# Patient Record
Sex: MALE | Race: BLACK OR AFRICAN AMERICAN | NOT HISPANIC OR LATINO | Employment: OTHER | URBAN - METROPOLITAN AREA
[De-identification: names, ages, dates, MRNs, and addresses within clinical notes are randomized per-mention and may not be internally consistent; named-entity substitution may affect disease eponyms.]

---

## 2017-10-02 ENCOUNTER — HOSPITAL ENCOUNTER (EMERGENCY)
Facility: HOSPITAL | Age: 67
Discharge: LEFT BEFORE DISCHARGE | End: 2017-10-02
Attending: EMERGENCY MEDICINE
Payer: MEDICARE

## 2017-10-02 VITALS
SYSTOLIC BLOOD PRESSURE: 130 MMHG | HEIGHT: 66 IN | WEIGHT: 140 LBS | TEMPERATURE: 98 F | DIASTOLIC BLOOD PRESSURE: 57 MMHG | RESPIRATION RATE: 20 BRPM | HEART RATE: 100 BPM | BODY MASS INDEX: 22.5 KG/M2 | OXYGEN SATURATION: 97 %

## 2017-10-02 DIAGNOSIS — R44.3 HALLUCINATIONS: ICD-10-CM

## 2017-10-02 PROCEDURE — 99284 EMERGENCY DEPT VISIT MOD MDM: CPT | Mod: 25

## 2017-10-02 PROCEDURE — 93005 ELECTROCARDIOGRAM TRACING: CPT

## 2017-10-02 PROCEDURE — 99285 EMERGENCY DEPT VISIT HI MDM: CPT | Mod: ,,, | Performed by: EMERGENCY MEDICINE

## 2017-10-02 NOTE — ED TRIAGE NOTES
"  During initial evaluation in patient room, he states he is ready to check out and go home - he states he was only here for a check-up and has no complaints at all - oriented to Tuesday (instead of Monday) October 1st, 2017    Daughter at bedside - states that he has been "out of character" for a few days    Patient insistent that he does not want to have any lab work and refuses vital signs at this time.    "

## 2017-10-02 NOTE — ED PROVIDER NOTES
"Encounter Date: 10/2/2017    SCRIBE #1 NOTE: I, Leonila Oviedo, am scribing for, and in the presence of, Dr. Mancera.       History     Chief Complaint   Patient presents with    Abdominal Pain     Pt c/o abdominal pain & dark urine.  Daughter states pt has been "rambling & talking out of his head".     Time seen by provider: 11:31 AM    This is a 67 y.o. male with a history of HLD, venous insufficiency, arthritis, and edema who presents with complaint of abdominal pain and dark urine. The patient reports that he is at baseline and is compliant with his medication. The patient indicates the last time he drank EtOH was approximately 2-3 days ago and denies EtOH withdrawal in the past. The relative indicates that for approximately 7 days he has had associated hallucinations telling her that he has "demons in his ear that are eating him from the inside out" and telling him that he is "going to hell." She reports associated anxiety and that yesterday he was calling family members and telling them that "he was already dead." She also reports that he lives alone and slept outside last night. She denies any history of psychiatric illness, family history of psychiatric illness, or previous episodes in the past. The relative indicates that he drinks EtOH mostly on the weekends and after work he may have approximately 2-3 drinks of EtOH. She denies illegal drug use.  The relative is Aminah Adrian .      The history is provided by the patient and a relative.     Review of patient's allergies indicates:  No Known Allergies  Past Medical History:   Diagnosis Date    Arthritis     Edema     Hyperlipidemia     Venous insufficiency      Past Surgical History:   Procedure Laterality Date    no sx       Family History   Problem Relation Age of Onset    Blindness Mother     Glaucoma Mother     Heart disease Mother     Edema Mother     Cataracts Father     Heart disease Father     Diabetes Father     Deep vein " thrombosis Sister     No Known Problems Brother     No Known Problems Maternal Aunt     No Known Problems Maternal Uncle     No Known Problems Paternal Aunt     No Known Problems Paternal Uncle     No Known Problems Maternal Grandmother     No Known Problems Maternal Grandfather     No Known Problems Paternal Grandmother     No Known Problems Paternal Grandfather     Amblyopia Neg Hx     Macular degeneration Neg Hx     Retinal detachment Neg Hx     Strabismus Neg Hx     Cancer Neg Hx     Hypertension Neg Hx     Stroke Neg Hx     Thyroid disease Neg Hx      Social History   Substance Use Topics    Smoking status: Never Smoker    Smokeless tobacco: Never Used    Alcohol use 1.8 oz/week     3 Cans of beer per week      Comment: per day     Review of Systems   Constitutional: Negative for fever.   HENT: Negative for sore throat.    Respiratory: Negative for shortness of breath.    Cardiovascular: Negative for chest pain.   Gastrointestinal: Positive for abdominal pain.   Genitourinary:        Dark urine.   Musculoskeletal: Negative for back pain.   Skin: Negative for pallor.   Neurological: Negative for headaches.   Psychiatric/Behavioral: Positive for hallucinations. The patient is nervous/anxious.        Physical Exam     Initial Vitals [10/02/17 1117]   BP Pulse Resp Temp SpO2   (!) 130/57 100 20 98.3 °F (36.8 °C) 97 %      MAP       81.33         Physical Exam    Nursing note and vitals reviewed.  Constitutional: No distress.   HENT:   Head: Normocephalic and atraumatic.   Eyes: Pupils are equal, round, and reactive to light.   Pupils 2 mm and reactive bilaterally.   Cardiovascular: Normal rate, regular rhythm, normal heart sounds and intact distal pulses.   Pulmonary/Chest: Breath sounds normal.   Lungs clear bilaterally.   Abdominal: Soft. There is no tenderness.   Negative CVA tenderness bilaterally.   Musculoskeletal: Normal range of motion. He exhibits no edema.   Peripheral pulses intact.  "Bilateral LE symmetric. No cords or edema.   Skin: Skin is warm.         ED Course   Procedures  Labs Reviewed   URINALYSIS, REFLEX TO URINE CULTURE   CBC W/ AUTO DIFFERENTIAL   COMPREHENSIVE METABOLIC PANEL   URINALYSIS, REFLEX TO URINE CULTURE   DRUG SCREEN PANEL, URINE EMERGENCY   ALCOHOL,MEDICAL (ETHANOL)   ACETAMINOPHEN LEVEL   TSH   TROPONIN I   AMMONIA   MAGNESIUM             Medical Decision Making:   History:   Old Medical Records: I decided to obtain old medical records.  Initial Assessment:   I was informed by the RN that the pt is refusing all blood draws. I evaluated the pt and stated that given the hallucinations at home, his daughter was concerned that he may require further evaluation. The pt is denying any hallucinations at this time. When questioned if he was dead, he states "I am alive here talking to you. I am not dead." The pt is oriented x3 at this time and is able to state the risks for leaving. He appears to have decision making capacity. He denies HI or SI or auditory or visual hallucinations. Psychiatry paged but prior to speaking with them, the pt eloped. Again as he had the capacity at this time and has no grave disability or emergent threat to self we did not chemically or physically restrain the pt. The pts daughter was counseled to return the pt for further evaluation or to call the 's office.  Clinical Tests:   Lab Tests: Ordered and Reviewed  Radiological Study: Ordered and Reviewed  Medical Tests: Ordered and Reviewed            Scribe Attestation:   Scribe #1: I performed the above scribed service and the documentation accurately describes the services I performed. I attest to the accuracy of the note.    Attending Attestation:           Physician Attestation for Scribe:      Comments: I, Dr. Leonid Mancera, personally performed the services described in this documentation. All medical record entries made by the scribe were at my direction and in my presence.  I have reviewed " the chart and agree that the record reflects my personal performance and is accurate and complete. Leonid Mancera MD.  1:50 PM 10/02/2017              ED Course      Clinical Impression:   The encounter diagnosis was Hallucinations.    Disposition:   Disposition: Eloped                        Leonid Mancera MD  10/02/17 1971

## 2017-10-26 ENCOUNTER — NURSE TRIAGE (OUTPATIENT)
Dept: ADMINISTRATIVE | Facility: CLINIC | Age: 67
End: 2017-10-26

## 2017-10-27 NOTE — TELEPHONE ENCOUNTER
ANGEL Ceron Staff 11 hours ago (8:52 PM)      Greetings,     CHARITOI-   Caregiver patient's sister  is concerned that patient is not eating and not drinking water. Patient states he only want to eat the foood of God. Patient recently discharged from [facility]. Education completed per [facility] Care Advice including  Reporting to ER for evaluation. Caregiver verbalize understanding.     Thank you,   Ruth Faciane, RN [facility] (Routing comment)

## 2017-10-27 NOTE — TELEPHONE ENCOUNTER
"    Reason for Disposition   Nursing judgment or information in reference    Answer Assessment - Initial Assessment Questions  1. REASON FOR CALL: "What is your main concern right now?"      My concern is that patient is not eating and not drinking water. Patient only want to eat the food of God.     2. ONSET: "When did the ___ start?"      Last week of September     3. SEVERITY: "How bad is the ___?"      Severe     4. FEVER: "Do you have a fever?"     Denies    5. OTHER SYMPTOMS: "Do you have any other new symptoms?"   Weak  Feeling sad sine his uncle Gurinder Gonzales    Incontinent of stool     6. INTERVENTIONS AND RESPONSE: "What have you done so far to try to make this better? What medications have you used?"    Caregiver called to police on 2017         7. PREGNANCY: "Is there any chance you are pregnant?"      N/A    Protocols used: ST NO GUIDELINE AVAILABLE - SICK ADULT CALL-A-    Caregiver patient's sister  is concerned that patient is not eating and not drinking water. Patient states he only want to eat the foood of God. Patient recently discharged from [facility]. Education completed per [facility] including  Reporting to ER for evaluation.   Caregiver verbalize understanding.  "

## 2017-11-16 ENCOUNTER — OFFICE VISIT (OUTPATIENT)
Dept: INTERNAL MEDICINE | Facility: CLINIC | Age: 67
End: 2017-11-16
Payer: MEDICARE

## 2017-11-16 VITALS
DIASTOLIC BLOOD PRESSURE: 58 MMHG | SYSTOLIC BLOOD PRESSURE: 88 MMHG | HEIGHT: 68 IN | BODY MASS INDEX: 23.99 KG/M2 | HEART RATE: 91 BPM | WEIGHT: 158.31 LBS

## 2017-11-16 DIAGNOSIS — L89.302 DECUBITUS ULCER OF BUTTOCK, STAGE 2, UNSPECIFIED LATERALITY: Primary | ICD-10-CM

## 2017-11-16 DIAGNOSIS — I95.9 HYPOTENSION, UNSPECIFIED HYPOTENSION TYPE: ICD-10-CM

## 2017-11-16 DIAGNOSIS — R63.4 WEIGHT LOSS: ICD-10-CM

## 2017-11-16 LAB
BACTERIA #/AREA URNS AUTO: ABNORMAL /HPF
BILIRUB UR QL STRIP: NEGATIVE
CLARITY UR REFRACT.AUTO: ABNORMAL
COLOR UR AUTO: YELLOW
GLUCOSE SERPL-MCNC: 140 MG/DL (ref 70–110)
GLUCOSE UR QL STRIP: NEGATIVE
HGB UR QL STRIP: NEGATIVE
HYALINE CASTS UR QL AUTO: 8 /LPF
KETONES UR QL STRIP: NEGATIVE
LEUKOCYTE ESTERASE UR QL STRIP: NEGATIVE
MICROSCOPIC COMMENT: ABNORMAL
NITRITE UR QL STRIP: NEGATIVE
PH UR STRIP: 5 [PH] (ref 5–8)
PROT UR QL STRIP: NEGATIVE
RBC #/AREA URNS AUTO: 1 /HPF (ref 0–4)
SP GR UR STRIP: 1.02 (ref 1–1.03)
SQUAMOUS #/AREA URNS AUTO: 3 /HPF
URATE CRY UR QL COMP ASSIST: ABNORMAL
URN SPEC COLLECT METH UR: ABNORMAL
UROBILINOGEN UR STRIP-ACNC: 2 EU/DL
WBC #/AREA URNS AUTO: 1 /HPF (ref 0–5)

## 2017-11-16 PROCEDURE — 99999 PR PBB SHADOW E&M-EST. PATIENT-LVL III: CPT | Mod: PBBFAC,,, | Performed by: INTERNAL MEDICINE

## 2017-11-16 PROCEDURE — 82948 REAGENT STRIP/BLOOD GLUCOSE: CPT | Mod: PBBFAC,PO | Performed by: INTERNAL MEDICINE

## 2017-11-16 PROCEDURE — 99213 OFFICE O/P EST LOW 20 MIN: CPT | Mod: PBBFAC,PO | Performed by: INTERNAL MEDICINE

## 2017-11-16 PROCEDURE — 99214 OFFICE O/P EST MOD 30 MIN: CPT | Mod: S$PBB,,, | Performed by: INTERNAL MEDICINE

## 2017-11-16 PROCEDURE — 81001 URINALYSIS AUTO W/SCOPE: CPT

## 2017-11-16 NOTE — PROGRESS NOTES
REASON FOR VISIT:  This is a 67-year-old male who I have not seen in quite some   time.  The purpose of his appointment is that after two weeks he has noticed   ulcerations on both buttocks and is painful.      He cannot really provide any further information regarding this or what has been   going on in the interim; however, he is very cachectic appearing.  I see that   in October he presented to Emergency Room where he had delirium and dark urine,   but he  did not stay in the Emergency Room.  I see at that time,   Periactin and Zyprexa were prescribed, but he is not on the medications.  Then   finally, the last time I saw him in June 2015, his weight was 206, and his   weight today is 158.    PAST MEDICAL HISTORY:  In the past has been:  Hypertension.  Lymphedema in both legs.  Statin-induced myalgias.    SOCIAL HISTORY:  Tobacco use - none.  He reports no alcohol use.    REVIEW OF SYSTEMS:  He has no pains in the chest or abdomen.  No shortness of   breath.  He states he has regular bowel function, maybe every other day, and   reports no difficulty urinating.  However, he is wearing diapers and his   explanation for this is a while ago, he was having nocturnal urination where he   was wetting his bed, but he has not done that in a while.    PHYSICAL EXAMINATION:  VITAL SIGNS:  Weight is 158.  His blood pressure is 86/58.  NECK:  No thyromegaly.  LUNGS:  Clear.  HEART:  Regular rate and rhythm.  ABDOMEN:  Active bowel sounds, soft, nontender.  He has 2+ edema in the legs.    He does have areas of blisters on his right leg and left foot.  He has two   prominent stage 2 decubitus ulcerations on his buttocks.  DIGITAL RECTAL:  Stool is brown, heme negative.  Prostate is mildly enlarged.    IMPRESSION:  1.  Decubitus ulceration.  2.  Significant weight loss.  3.  Hypotension.    PLAN:  Urinalysis today, comprehensive set of labs, stat blood glucose, and   [facility] regarding his decubitus  ulcerations.      SU/IN  dd: 11/16/2017 11:05:52 (CST)  td: 11/17/2017 03:32:05 (CST)  Doc ID   #[id number]  Job ID #[id number]    CC:

## 2017-11-17 ENCOUNTER — INITIAL CONSULT (OUTPATIENT)
Dept: WOUND CARE | Facility: CLINIC | Age: 67
End: 2017-11-17
Payer: MEDICARE

## 2017-11-17 DIAGNOSIS — L89.302 DECUBITUS ULCER OF BUTTOCK, STAGE 2, UNSPECIFIED LATERALITY: Primary | ICD-10-CM

## 2017-11-17 DIAGNOSIS — R60.0 LOWER LEG EDEMA: ICD-10-CM

## 2017-11-17 DIAGNOSIS — R53.81 DEBILITY: ICD-10-CM

## 2017-11-17 PROCEDURE — 99212 OFFICE O/P EST SF 10 MIN: CPT | Mod: PBBFAC | Performed by: CLINICAL NURSE SPECIALIST

## 2017-11-17 PROCEDURE — 99999 PR PBB SHADOW E&M-EST. PATIENT-LVL II: CPT | Mod: PBBFAC,,, | Performed by: CLINICAL NURSE SPECIALIST

## 2017-11-17 PROCEDURE — 99202 OFFICE O/P NEW SF 15 MIN: CPT | Mod: S$PBB,,, | Performed by: CLINICAL NURSE SPECIALIST

## 2017-11-17 RX ORDER — MENTHOL AND ZINC OXIDE .44; 20.625 G/100G; G/100G
OINTMENT TOPICAL 2 TIMES DAILY
COMMUNITY
Start: 2017-11-17 | End: 2017-11-25

## 2017-11-17 NOTE — LETTER
November 17, 2017      Dickson Laurent MD  [address]  [facility] [address]           [facility]  [address]  [facility] [address]  Phone: [phone]          Patient: Oral Gonzales   MR Number: [id number]   YOB: 1950   Date of Visit: 11/17/2017       Dear Dr. Dickson Laurent:    Thank you for referring Oral Gonzales to me for evaluation. Attached you will find relevant portions of my assessment and plan of care.    If you have questions, please do not hesitate to call me. I look forward to following Oral Gonzales along with you.    Sincerely,    Rachel Norton, CNS    Enclosure  CC:  No Recipients    If you would like to receive this communication electronically, please contact [email or web address] or [phone] to request more information on [employer] access.    For providers and/or their staff who would like to refer a patient to [facility], please contact us through our one-stop-shop provider referral line, Kuldip Russ, at [phone].    If you feel you have received this communication in error or would no longer like to receive these types of communications, please e-mail [email or web address]

## 2017-11-17 NOTE — PROGRESS NOTES
"Subjective:       Patient ID: Oral Gonzales is a 67 y.o. male.    Chief Complaint: Wound Check and Pressure Ulcer    This is a new pt sent by Dr Marquez for eval of his buttocks which has stage 2's in several locations, pt is not great historian but does say he sits a lot and was "sick" of late and sat a lot resulting in apparent pressure ulcers that are stage 2 in appearance. He is continent and says he can get around however in w/c today and moves very slowly and tenatively . Lives with sister who he says does and can help him. But she is not here today.      Wound Check       Review of Systems   Constitutional: Positive for activity change and appetite change. Negative for fever.   Respiratory: Negative for cough and shortness of breath.    Cardiovascular: Positive for chest pain and leg swelling.   Gastrointestinal: Negative for constipation.   Genitourinary: Negative for hematuria.   Skin: Positive for wound.   Neurological: Positive for weakness.       Objective:      Physical Exam   Constitutional: He appears well-developed.   Pulmonary/Chest: Effort normal and breath sounds normal.   Abdominal: Soft. He exhibits no distension.   Musculoskeletal: Normal range of motion. He exhibits edema.   Skin:              Pt instructed to use calmoseptine topically to buttocks and glut creas 1-2 time a day         His lower legs are edematous, Albumin is low and discussed issues that increase swelling, he uses salt liberally he stated and eats campbells soup a lot. Discussed protein sources and to hold salt and soups for now . The marks of back of each leg in achilles region are pressure in nature from resting of foot rest too long, discussed seating options and how to monitor this ,      Assessment:       1. Decubitus ulcer of buttock, stage 2, unspecified laterality    2. Debility    3. Lower leg edema        Plan:       Topical care to the buttocks with calmoseptine daily, pt can apply to self he states but does have " sister willing to help  Discussed diet and protein sources, and to stop adding salt to food   Elevate legs but be careful with the edge of recliner and pressure to back of legs  Return if not improved  I have reviewed the plan of care with the patient and he express understanding. I spent over 50% of this 20 minute visit in face to face counseling.

## 2017-11-25 ENCOUNTER — HOSPITAL ENCOUNTER (EMERGENCY)
Facility: HOSPITAL | Age: 67
Discharge: HOME | End: 2017-11-25
Attending: EMERGENCY MEDICINE
Payer: MEDICARE

## 2017-11-25 VITALS
TEMPERATURE: 98 F | BODY MASS INDEX: 24.25 KG/M2 | SYSTOLIC BLOOD PRESSURE: 103 MMHG | HEART RATE: 76 BPM | WEIGHT: 160 LBS | OXYGEN SATURATION: 100 % | RESPIRATION RATE: 16 BRPM | HEIGHT: 68 IN | DIASTOLIC BLOOD PRESSURE: 55 MMHG

## 2017-11-25 DIAGNOSIS — R53.1 WEAKNESS: ICD-10-CM

## 2017-11-25 DIAGNOSIS — R60.0 BILATERAL LEG EDEMA: Primary | ICD-10-CM

## 2017-11-25 LAB
ALBUMIN SERPL BCP-MCNC: 2.5 G/DL
ALP SERPL-CCNC: 99 U/L
ALT SERPL W/O P-5'-P-CCNC: 41 U/L
ANION GAP SERPL CALC-SCNC: 8 MMOL/L
AST SERPL-CCNC: 32 U/L
BASOPHILS # BLD AUTO: 0.03 K/UL
BASOPHILS NFR BLD: 0.5 %
BILIRUB SERPL-MCNC: 0.2 MG/DL
BNP SERPL-MCNC: 422 PG/ML
BUN SERPL-MCNC: 11 MG/DL
CALCIUM SERPL-MCNC: 8.7 MG/DL
CHLORIDE SERPL-SCNC: 107 MMOL/L
CO2 SERPL-SCNC: 25 MMOL/L
CREAT SERPL-MCNC: 0.9 MG/DL
DIFFERENTIAL METHOD: ABNORMAL
EOSINOPHIL # BLD AUTO: 0.1 K/UL
EOSINOPHIL NFR BLD: 2.1 %
ERYTHROCYTE [DISTWIDTH] IN BLOOD BY AUTOMATED COUNT: 13.7 %
EST. GFR  (AFRICAN AMERICAN): >60 ML/MIN/1.73 M^2
EST. GFR  (NON AFRICAN AMERICAN): >60 ML/MIN/1.73 M^2
GLUCOSE SERPL-MCNC: 93 MG/DL
HCT VFR BLD AUTO: 32.1 %
HGB BLD-MCNC: 10.3 G/DL
IMM GRANULOCYTES # BLD AUTO: 0.02 K/UL
IMM GRANULOCYTES NFR BLD AUTO: 0.4 %
LYMPHOCYTES # BLD AUTO: 1.1 K/UL
LYMPHOCYTES NFR BLD: 20.1 %
MCH RBC QN AUTO: 29.9 PG
MCHC RBC AUTO-ENTMCNC: 32.1 G/DL
MCV RBC AUTO: 93 FL
MONOCYTES # BLD AUTO: 0.6 K/UL
MONOCYTES NFR BLD: 10 %
NEUTROPHILS # BLD AUTO: 3.8 K/UL
NEUTROPHILS NFR BLD: 66.9 %
NRBC BLD-RTO: 0 /100 WBC
PLATELET # BLD AUTO: 195 K/UL
PMV BLD AUTO: 8.8 FL
POTASSIUM SERPL-SCNC: 4.7 MMOL/L
PROT SERPL-MCNC: 5.9 G/DL
RBC # BLD AUTO: 3.44 M/UL
SODIUM SERPL-SCNC: 140 MMOL/L
WBC # BLD AUTO: 5.61 K/UL

## 2017-11-25 PROCEDURE — 93010 ELECTROCARDIOGRAM REPORT: CPT | Mod: ,,, | Performed by: INTERNAL MEDICINE

## 2017-11-25 PROCEDURE — 99284 EMERGENCY DEPT VISIT MOD MDM: CPT | Mod: ,,, | Performed by: EMERGENCY MEDICINE

## 2017-11-25 PROCEDURE — 83880 ASSAY OF NATRIURETIC PEPTIDE: CPT

## 2017-11-25 PROCEDURE — 85025 COMPLETE CBC W/AUTO DIFF WBC: CPT

## 2017-11-25 PROCEDURE — 80053 COMPREHEN METABOLIC PANEL: CPT

## 2017-11-25 PROCEDURE — 93005 ELECTROCARDIOGRAM TRACING: CPT

## 2017-11-25 PROCEDURE — 99284 EMERGENCY DEPT VISIT MOD MDM: CPT | Mod: 25

## 2017-11-25 RX ORDER — FUROSEMIDE 20 MG/1
20 TABLET ORAL DAILY
Qty: 7 TABLET | Refills: 0 | Status: SHIPPED | OUTPATIENT
Start: 2017-11-25 | End: 2018-03-20

## 2017-11-25 NOTE — ED TRIAGE NOTES
Pt. Arrives to ER after being seen by a family friend (nurse practitioner). She recommended that he be seen.  Pt. Presents with bilateral lower leg swelling with onset 3 weeks ago.  Pt. Complains of pain described as throbbing, fire, and pins and needles.  Skin break down noted to bilateral heels and lower extremities.

## 2017-11-25 NOTE — ED NOTES
Pt placed on continuous cardiac and pulse ox monitoring. VS stable.  Pt denies needs or complaints at this time.  Bed locked in lowest position; side rails up and locked x 2; call light and personal belongings within reach; will continue to monitor pt. Sister at bedside.

## 2017-11-25 NOTE — ED NOTES
LOC: The patient is awake, alert, and oriented to place, time, situation. Affect is appropriate.  Speech is appropriate and clear.     APPEARANCE:  Patient is clean and well groomed.    SKIN: Breakdown noted to bilateral lower extremities.  Pressure sores noted to bilateral heels.  Open sore to left heel.       RESPIRATORY: Airway is open and patent. Respirations spontaneous, even, easy, and non-labored.  Patient has a normal effort and rate.  No accessory muscle use noted.      CARDIAC:  Normal rhythm and rate noted.  4+ peripheral edema noted. No complaints of chest pain.      ABDOMEN: Soft and non tender to palpation.  No distention noted.     NEUROLOGIC: Eyes open spontaneously.  Behavior appropriate to situation.  Follows commands; facial expression symmetrical.  Purposeful motor response noted; pins and needles feeling noted to bilateral lower extremities.

## 2017-11-25 NOTE — ED PROVIDER NOTES
"Encounter Date: 11/25/2017       History     Chief Complaint   Patient presents with    Leg Swelling     bilateral legs swollen, family states "there are starting to be little sores on his legs"/     66 y/o M with PMHx of hyperlipidemia and venous insufficiency presents to the ED c/o bilateral LE edema x 1 month.  He reports the swelling worsened over the past 2 days after eating Chinese food. He has been seen by his PCP and wound care for the same complaint about 1 week ago. He states he has been trying to elevate his legs but does not have the proper chair to do so and is getting a new one. He does not wear compression stockings.  A family friend who is a NP saw him today and recommended that he come to the ED because of the leg swelling. He denies f/c, chest pain, SOB, PENA, cough, abdominal pain, abdominal distention. He drinks alcohol and denies tobacco or drug use.      The history is provided by the patient and a relative (sister).     Review of patient's allergies indicates:  No Known Allergies  Past Medical History:   Diagnosis Date    Arthritis     Edema     Hyperlipidemia     Venous insufficiency      Past Surgical History:   Procedure Laterality Date    no sx       Family History   Problem Relation Age of Onset    Blindness Mother     Glaucoma Mother     Heart disease Mother     Edema Mother     Cataracts Father     Heart disease Father     Diabetes Father     Deep vein thrombosis Sister     No Known Problems Brother     No Known Problems Maternal Aunt     No Known Problems Maternal Uncle     No Known Problems Paternal Aunt     No Known Problems Paternal Uncle     No Known Problems Maternal Grandmother     No Known Problems Maternal Grandfather     No Known Problems Paternal Grandmother     No Known Problems Paternal Grandfather     Amblyopia Neg Hx     Macular degeneration Neg Hx     Retinal detachment Neg Hx     Strabismus Neg Hx     Cancer Neg Hx     Hypertension Neg Hx  "    Stroke Neg Hx     Thyroid disease Neg Hx      Social History   Substance Use Topics    Smoking status: Never Smoker    Smokeless tobacco: Never Used    Alcohol use 1.8 oz/week     3 Cans of beer per week      Comment: per day     Review of Systems   Constitutional: Negative for chills and fever.   HENT: Negative for congestion, rhinorrhea and sore throat.    Eyes: Negative for photophobia and visual disturbance.   Respiratory: Negative for cough and shortness of breath.    Cardiovascular: Positive for leg swelling. Negative for chest pain and palpitations.   Gastrointestinal: Negative for abdominal pain, constipation, diarrhea, nausea and vomiting.   Genitourinary: Negative for dysuria and hematuria.   Musculoskeletal: Negative for neck pain and neck stiffness.   Skin: Negative for rash and wound.   Neurological: Negative for light-headedness, numbness and headaches.   Psychiatric/Behavioral: Negative for confusion.       Physical Exam     Initial Vitals [11/25/17 1444]   BP Pulse Resp Temp SpO2   (!) 126/55 98 18 98.3 °F (36.8 °C) 98 %      MAP       78.67         Physical Exam    Nursing note and vitals reviewed.  Constitutional: He appears well-developed and well-nourished. He is not diaphoretic. No distress.   HENT:   Head: Normocephalic and atraumatic.   Neck: Normal range of motion. Neck supple.   Cardiovascular: Normal rate, regular rhythm, normal heart sounds and intact distal pulses. Exam reveals no gallop and no friction rub.    No murmur heard.  3+ LE edema noted   Pulmonary/Chest: Breath sounds normal. He has no wheezes. He has no rhonchi. He has no rales.   Abdominal: Soft. Bowel sounds are normal. He exhibits no distension. There is no tenderness. There is no rebound and no guarding.   Musculoskeletal: Normal range of motion.   Neurological: He is alert and oriented to person, place, and time.   Skin: Skin is warm and dry.   Developing pressure ulcer noted to the L heel and R posterior lower  leg. No bleeding or drainage appreciated.   Psychiatric: He has a normal mood and affect.         ED Course   Procedures  Labs Reviewed   CBC W/ AUTO DIFFERENTIAL - Abnormal; Notable for the following:        Result Value    RBC 3.44 (*)     Hemoglobin 10.3 (*)     Hematocrit 32.1 (*)     MPV 8.8 (*)     All other components within normal limits   COMPREHENSIVE METABOLIC PANEL - Abnormal; Notable for the following:     Total Protein 5.9 (*)     Albumin 2.5 (*)     All other components within normal limits   B-TYPE NATRIURETIC PEPTIDE - Abnormal; Notable for the following:      (*)     All other components within normal limits        Imaging Results          X-Ray Chest 1 View (Final result)  Result time 11/25/17 16:15:11    Final result by Rafa Leyva MD (11/25/17 16:15:11)                 Impression:      No acute cardiopulmonary process.      Electronically signed by: RAFA LEYVA MD  Date:     11/25/17  Time:    16:15              Narrative:    Chest one view    Indication:Weakness    Comparison:6/25/2012    Findings:  The cardiomediastinal silhouette is not enlarged.  There is no pleural effusion.  The trachea is midline.  The lungs are symmetrically expanded bilaterally without evidence of acute parenchymal process. No large focal consolidation seen.  There is no pneumothorax.  The osseous structures are remarkable for degenerative changes.                                 Medical Decision Making:   History:   Old Medical Records: I decided to obtain old medical records.  Old Records Summarized: records from clinic visits.       <> Summary of Records: Seen by PCP (Dr Laurent) and wound care. Outpatient labs reviewed and unremarkable.  Clinical Tests:   Lab Tests: Ordered and Reviewed  Radiological Study: Ordered and Reviewed  Medical Tests: Reviewed and Ordered       APC / Resident Notes:   68 y/o M with PMHx of hyperlipidemia and venous insufficiency presents to the ED c/o bilateral LE edema x 1  month.  VSS. RRR. Lungs clear with no crackles. Abdomen soft, nontender, and nondistended. 3+ bilateral LE edema noted. Developing pressure ulcers noted to the L heel and R lower posterior leg. No bleeding or drainage noted. Normal sensation. DDx includes but is not limited to venous insufficiency, CHF, dependent edema. Will get labs, CXR.     No leukocytosis. Anemia noted with H/H 10.3/32.1. CMP unremarkable. BNP elevated at 422.    CXR with no acute cardiopulmonary process.     I do not feel that he needs any further labs or imaging at this time. Stable for discharge.    He was discharged with a prescription for lasix.  He will follow up with his PCP.  He was instructed to wear compression stockings daily.  All of the patient's questions were answered.  I reviewed the patient's chart, labs, and imaging and discussed the case with my supervising physician.                 ED Course      Clinical Impression:   The primary encounter diagnosis was Bilateral leg edema. A diagnosis of Weakness was also pertinent to this visit.    Disposition:   Disposition: Discharged  Condition: Stable       I discussed case with KARIN.  Reviewed history, physical exam findings, and plan.   I was available for supervision as indicated by the KARIN.    I, Dr. Marcelino Arrieta, personally performed the services described in this documentation. All medical record entries made by the scribe were at my direction and in my presence.  I have reviewed the chart and agree that the record reflects my personal performance and is accurate and complete. Marcelino Arrieta MD.  12:04 PM 11/26/2017                   Leonila Grande PA-C  11/25/17 7310       Marcelino Arrieta MD  11/26/17 0337

## 2017-11-25 NOTE — ED NOTES
Pt resting quietly on stretcher; remains on continuous cardiac and pulse ox monitoring with non-invasive blood pressure to cycle every 30 minutes.  VS stable; NSR noted. Bed locked in lowest position; side rails up and locked x 2; call light, bedside table, and personal belongings within reach.  Pt denies needs or complaints at this time; updated on possible discharge; will continue to monitor pt.

## 2017-11-25 NOTE — DISCHARGE INSTRUCTIONS
Start wearing compression stocking for leg swelling. Take OTC miralax as needed for constipation. Avoid salty foods.

## 2018-03-15 ENCOUNTER — TELEPHONE (OUTPATIENT)
Dept: INTERNAL MEDICINE | Facility: CLINIC | Age: 68
End: 2018-03-15

## 2018-03-15 NOTE — TELEPHONE ENCOUNTER
Called pt back  And pt states that he has been out since dec 6 and he needs a note and an appt with Dr Laurent to be seen the first available is not until 4/5/18 pt took that appt and he would like a note to return to work on the 21st. If he does not have this not he will lose his job. I explained that Dr Laurent was not in and would be returning Monday he asked that Dr Laurent please write him a note on Monday to return on Wednesday and he would come and pick it up

## 2018-03-15 NOTE — TELEPHONE ENCOUNTER
----- Message from Yazmin Guy sent at 3/15/2018 12:00 PM CDT -----  Contact: Pt mariela colin at [phone]  Pt is calling to speak with the nurse concerning return to work clearance. Pt is looking to return to work on Wednesday. Please contact pt.    This is pt second message no response.    Please contact pt      Thank you!

## 2018-03-19 ENCOUNTER — TELEPHONE (OUTPATIENT)
Dept: INTERNAL MEDICINE | Facility: CLINIC | Age: 68
End: 2018-03-19

## 2018-03-19 NOTE — TELEPHONE ENCOUNTER
Called pt back  And pt states that he has been out since dec 6 and he needs a note and an appt with Dr Laurent to be seen the first available is not until 4/5/18 pt took that appt and he would like a note to return to work on the 21st. If he does not have this not he will lose his job. I explained that Dr Laurent was not in and would be returning Monday he asked that Dr Laurent please write him a note on Monday to return on Wednesday and he would come and pick it up         Documentation

## 2018-03-19 NOTE — TELEPHONE ENCOUNTER
Patient would like to know if his return to work slip is ready to be picked up. Please call when ready

## 2018-03-19 NOTE — TELEPHONE ENCOUNTER
----- Message from Tosha Nieves sent at 3/19/2018  2:40 PM CDT -----  Contact: patient- [phone]  Patient would like to know if his return to work slip is ready to be picked up. Please call when ready

## 2018-03-19 NOTE — TELEPHONE ENCOUNTER
He has been out of work since dec 6th , he was out of it and on bed rest most of the time, he thinks it was a nervous breakdown and health problems , he has to go back to work and they called him on wednesday , he states he needs a back to work note dated from dec 6 to the 21. He was in the er for 1 day and then sent to a physiatric facility  for 3 weeks , he came home and went to stay with his sister. He couldn't do anything for himself . He needs this note to go back to work or they will terminate him.        He has an upcoming appt on 5/4/17       Please

## 2018-03-20 ENCOUNTER — OFFICE VISIT (OUTPATIENT)
Dept: INTERNAL MEDICINE | Facility: CLINIC | Age: 68
End: 2018-03-20
Payer: MEDICARE

## 2018-03-20 ENCOUNTER — LAB VISIT (OUTPATIENT)
Dept: LAB | Facility: HOSPITAL | Age: 68
End: 2018-03-20
Attending: INTERNAL MEDICINE
Payer: MEDICARE

## 2018-03-20 VITALS
HEART RATE: 80 BPM | DIASTOLIC BLOOD PRESSURE: 64 MMHG | WEIGHT: 181 LBS | BODY MASS INDEX: 27.43 KG/M2 | HEIGHT: 68 IN | SYSTOLIC BLOOD PRESSURE: 130 MMHG

## 2018-03-20 DIAGNOSIS — Z12.5 SCREENING FOR PROSTATE CANCER: ICD-10-CM

## 2018-03-20 DIAGNOSIS — Z00.00 GENERAL MEDICAL EXAM: ICD-10-CM

## 2018-03-20 DIAGNOSIS — Z00.00 GENERAL MEDICAL EXAM: Primary | ICD-10-CM

## 2018-03-20 DIAGNOSIS — R60.0 BILATERAL LEG EDEMA: ICD-10-CM

## 2018-03-20 LAB
ALBUMIN SERPL BCP-MCNC: 4.2 G/DL
ALP SERPL-CCNC: 85 U/L
ALT SERPL W/O P-5'-P-CCNC: 17 U/L
ANION GAP SERPL CALC-SCNC: 10 MMOL/L
AST SERPL-CCNC: 20 U/L
BASOPHILS # BLD AUTO: 0.03 K/UL
BASOPHILS NFR BLD: 0.5 %
BILIRUB SERPL-MCNC: 0.3 MG/DL
BILIRUB UR QL STRIP: NEGATIVE
BNP SERPL-MCNC: 38 PG/ML
BUN SERPL-MCNC: 17 MG/DL
CALCIUM SERPL-MCNC: 10.3 MG/DL
CHLORIDE SERPL-SCNC: 104 MMOL/L
CLARITY UR REFRACT.AUTO: CLEAR
CO2 SERPL-SCNC: 27 MMOL/L
COLOR UR AUTO: YELLOW
COMPLEXED PSA SERPL-MCNC: 2.4 NG/ML
CREAT SERPL-MCNC: 1.3 MG/DL
DIFFERENTIAL METHOD: ABNORMAL
EOSINOPHIL # BLD AUTO: 0.1 K/UL
EOSINOPHIL NFR BLD: 2.2 %
ERYTHROCYTE [DISTWIDTH] IN BLOOD BY AUTOMATED COUNT: 12.3 %
EST. GFR  (AFRICAN AMERICAN): >60 ML/MIN/1.73 M^2
EST. GFR  (NON AFRICAN AMERICAN): 56.5 ML/MIN/1.73 M^2
GLUCOSE SERPL-MCNC: 96 MG/DL
GLUCOSE UR QL STRIP: NEGATIVE
HCT VFR BLD AUTO: 47.2 %
HGB BLD-MCNC: 15 G/DL
HGB UR QL STRIP: NEGATIVE
IMM GRANULOCYTES # BLD AUTO: 0.01 K/UL
IMM GRANULOCYTES NFR BLD AUTO: 0.2 %
KETONES UR QL STRIP: NEGATIVE
LEUKOCYTE ESTERASE UR QL STRIP: NEGATIVE
LYMPHOCYTES # BLD AUTO: 1.4 K/UL
LYMPHOCYTES NFR BLD: 24.4 %
MCH RBC QN AUTO: 29.5 PG
MCHC RBC AUTO-ENTMCNC: 31.8 G/DL
MCV RBC AUTO: 93 FL
MONOCYTES # BLD AUTO: 0.6 K/UL
MONOCYTES NFR BLD: 10.7 %
NEUTROPHILS # BLD AUTO: 3.6 K/UL
NEUTROPHILS NFR BLD: 62 %
NITRITE UR QL STRIP: NEGATIVE
NRBC BLD-RTO: 0 /100 WBC
PH UR STRIP: 5 [PH] (ref 5–8)
PLATELET # BLD AUTO: 236 K/UL
PMV BLD AUTO: 9.6 FL
POTASSIUM SERPL-SCNC: 4.2 MMOL/L
PROT SERPL-MCNC: 7.7 G/DL
PROT UR QL STRIP: NEGATIVE
RBC # BLD AUTO: 5.09 M/UL
SODIUM SERPL-SCNC: 141 MMOL/L
SP GR UR STRIP: 1.02 (ref 1–1.03)
URN SPEC COLLECT METH UR: NORMAL
UROBILINOGEN UR STRIP-ACNC: NEGATIVE EU/DL
WBC # BLD AUTO: 5.82 K/UL

## 2018-03-20 PROCEDURE — 80053 COMPREHEN METABOLIC PANEL: CPT

## 2018-03-20 PROCEDURE — 36415 COLL VENOUS BLD VENIPUNCTURE: CPT | Mod: PO

## 2018-03-20 PROCEDURE — 99214 OFFICE O/P EST MOD 30 MIN: CPT | Mod: S$PBB,,, | Performed by: INTERNAL MEDICINE

## 2018-03-20 PROCEDURE — 81003 URINALYSIS AUTO W/O SCOPE: CPT

## 2018-03-20 PROCEDURE — 83880 ASSAY OF NATRIURETIC PEPTIDE: CPT

## 2018-03-20 PROCEDURE — 84153 ASSAY OF PSA TOTAL: CPT

## 2018-03-20 PROCEDURE — 99213 OFFICE O/P EST LOW 20 MIN: CPT | Mod: PBBFAC,PO | Performed by: INTERNAL MEDICINE

## 2018-03-20 PROCEDURE — 85025 COMPLETE CBC W/AUTO DIFF WBC: CPT

## 2018-03-20 PROCEDURE — 99999 PR PBB SHADOW E&M-EST. PATIENT-LVL III: CPT | Mod: PBBFAC,,, | Performed by: INTERNAL MEDICINE

## 2018-03-20 NOTE — PROGRESS NOTES
REASON FOR VISIT:  This is a 67-year-old male.  The reason why this appointment   was set up is to get a clearance to go back to work at [employer].  Apparently, he   has been out of work since December 6th.    The situation, story and circumstances are a bit confusing.  He cannot really   tell me any information since I last saw him in November 2017.  He says he has   been living with his sister who he has been taken care of.  When I saw him on   11/16/2017, it was a while since I have seen him before.  It was very apparent   that he had lost a tremendous amount of weight, was cachectic and ill-appearing,   and had decubitus ulceration.  Lab testing showing malnutrition with albumin of   2.5 and being anemic; however, there was no followup with him due to inability   to contact him.    Presently, he said he feels well.  No particular complaints.  He is able to   function independently.    PAST MEDICAL HISTORY:  Hypertension.  Lymphedema of both legs.  Statin-induced myalgias.    SOCIAL HISTORY:  Tobacco use, none.  Alcohol use, none.  Although in the past he   used to drink beer.    REVIEW OF SYMPTOMS:  He reports no chest or abdominal pain.  No shortness of   breath.  He says he has regular bowel function.  No difficulty urinating.  No   arthralgias, headaches, or heartburn.    PHYSICAL EXAMINATION:  VITAL SIGNS:  Weight 181 pounds, it was 158 on 11/16/2017.  Pulse rate is 80.    Blood pressure 130/64, it was 86/58 in November.  GENERAL APPEARANCE:  He is not ill-appearing.  NECK:  No thyromegaly.  LUNGS:  Clear breath sounds.  HEART:  Regular rate and rhythm.  ABDOMEN:  Active bowel sounds, soft, nontender.  No hepatosplenomegaly or   abdominal masses.  PULSES:  2+ carotid, 1+ pedal pulses.  EXTREMITIES:  Does have edema involving his legs and to his ankles and feet   below his knee.  RECTAL:  Stool is brown, heme negative.  Prostate minimally enlarged.  SKIN:  Hyperpigmented changes involving his legs.  I could  see scarring changes   on his left buttocks and right buttocks medially where he had decubitus   ulceration.    IMPRESSION:  1.  General examination.  2.  Lower extremity edema.    PLAN:  Today we will get routine labs.  We will also arrange for venous Doppler   of the leg for reassessment of the edema and he is cleared to go back to work.    It should also be noted in December 2014, a colonoscopy was normal other than   diverticulosis.        /ls [id number] review        SU/MICHAEL  dd: 03/20/2018 13:56:30 (CDT)  td: 03/21/2018 01:47:23 (CDT)  Doc ID   #[id number]  Job ID #[id number]    CC:

## 2018-03-21 ENCOUNTER — TELEPHONE (OUTPATIENT)
Dept: INTERNAL MEDICINE | Facility: CLINIC | Age: 68
End: 2018-03-21

## 2018-03-21 NOTE — TELEPHONE ENCOUNTER
----- Message from Dickson Laurent MD sent at 3/21/2018  7:34 AM CDT -----  Call the patient      let him know that his lab studies from his visit looked fine      markedly better from the lab studies see had in November 2017   will follow up with him after he has his ultrasound of his legs

## 2018-03-21 NOTE — PROGRESS NOTES
Call the patient      let him know that his lab studies from his visit looked fine      markedly better from the lab studies see had in November 2017   will follow up with him after he has his ultrasound of his legs

## 2018-12-13 ENCOUNTER — HOSPITAL ENCOUNTER (INPATIENT)
Facility: OTHER | Age: 68
LOS: 1 days | Discharge: PSYCHIATRIC OR SUBSTANCE USE FACILITY | DRG: 683 | End: 2018-12-14
Attending: EMERGENCY MEDICINE | Admitting: INTERNAL MEDICINE
Payer: MEDICARE

## 2018-12-13 DIAGNOSIS — N17.9 AKI (ACUTE KIDNEY INJURY): Primary | ICD-10-CM

## 2018-12-13 DIAGNOSIS — F23 ACUTE PSYCHOSIS: ICD-10-CM

## 2018-12-13 DIAGNOSIS — N18.30 CKD (CHRONIC KIDNEY DISEASE) STAGE 3, GFR 30-59 ML/MIN: ICD-10-CM

## 2018-12-13 DIAGNOSIS — Z91.148 NON COMPLIANCE W MEDICATION REGIMEN: ICD-10-CM

## 2018-12-13 DIAGNOSIS — F20.9 SCHIZOPHRENIA, UNSPECIFIED TYPE: ICD-10-CM

## 2018-12-13 DIAGNOSIS — E87.5 HYPERKALEMIA: ICD-10-CM

## 2018-12-13 DIAGNOSIS — F20.0 PARANOID SCHIZOPHRENIA: ICD-10-CM

## 2018-12-13 DIAGNOSIS — R60.9 EDEMA: ICD-10-CM

## 2018-12-13 LAB
ALBUMIN SERPL BCP-MCNC: 4.6 G/DL
ALP SERPL-CCNC: 87 U/L
ALT SERPL W/O P-5'-P-CCNC: 39 U/L
AMORPH CRY URNS QL MICRO: ABNORMAL
AMPHET+METHAMPHET UR QL: NEGATIVE
ANION GAP SERPL CALC-SCNC: 13 MMOL/L
APAP SERPL-MCNC: <3 UG/ML
AST SERPL-CCNC: 50 U/L
BACTERIA #/AREA URNS HPF: ABNORMAL /HPF
BARBITURATES UR QL SCN>200 NG/ML: NEGATIVE
BASOPHILS # BLD AUTO: 0.01 K/UL
BASOPHILS NFR BLD: 0.1 %
BENZODIAZ UR QL SCN>200 NG/ML: NEGATIVE
BILIRUB SERPL-MCNC: 0.7 MG/DL
BILIRUB UR QL STRIP: NEGATIVE
BNP SERPL-MCNC: <10 PG/ML
BUN SERPL-MCNC: 32 MG/DL
BZE UR QL SCN: NEGATIVE
CALCIUM SERPL-MCNC: 10.2 MG/DL
CANNABINOIDS UR QL SCN: NEGATIVE
CHLORIDE SERPL-SCNC: 103 MMOL/L
CLARITY UR: CLEAR
CO2 SERPL-SCNC: 22 MMOL/L
COLOR UR: YELLOW
CREAT SERPL-MCNC: 1.9 MG/DL
CREAT UR-MCNC: 260.1 MG/DL
DIFFERENTIAL METHOD: ABNORMAL
EOSINOPHIL # BLD AUTO: 0 K/UL
EOSINOPHIL NFR BLD: 0.1 %
ERYTHROCYTE [DISTWIDTH] IN BLOOD BY AUTOMATED COUNT: 14.1 %
EST. GFR  (AFRICAN AMERICAN): 41 ML/MIN/1.73 M^2
EST. GFR  (NON AFRICAN AMERICAN): 35 ML/MIN/1.73 M^2
ETHANOL SERPL-MCNC: <10 MG/DL
GLUCOSE SERPL-MCNC: 124 MG/DL
GLUCOSE UR QL STRIP: NEGATIVE
HCT VFR BLD AUTO: 46 %
HGB BLD-MCNC: 15.3 G/DL
HGB UR QL STRIP: ABNORMAL
HYALINE CASTS #/AREA URNS LPF: 1 /LPF
KETONES UR QL STRIP: ABNORMAL
LEUKOCYTE ESTERASE UR QL STRIP: NEGATIVE
LYMPHOCYTES # BLD AUTO: 0.6 K/UL
LYMPHOCYTES NFR BLD: 6.9 %
MCH RBC QN AUTO: 30.7 PG
MCHC RBC AUTO-ENTMCNC: 33.3 G/DL
MCV RBC AUTO: 92 FL
METHADONE UR QL SCN>300 NG/ML: NEGATIVE
MICROSCOPIC COMMENT: ABNORMAL
MONOCYTES # BLD AUTO: 0.7 K/UL
MONOCYTES NFR BLD: 7.9 %
NEUTROPHILS # BLD AUTO: 7.2 K/UL
NEUTROPHILS NFR BLD: 84.9 %
NITRITE UR QL STRIP: NEGATIVE
OPIATES UR QL SCN: NEGATIVE
PCP UR QL SCN>25 NG/ML: NEGATIVE
PH UR STRIP: 5 [PH] (ref 5–8)
PLATELET # BLD AUTO: 161 K/UL
PMV BLD AUTO: 9.7 FL
POCT GLUCOSE: 92 MG/DL (ref 70–110)
POTASSIUM SERPL-SCNC: 6.1 MMOL/L
PROT SERPL-MCNC: 8.5 G/DL
PROT UR QL STRIP: ABNORMAL
RBC # BLD AUTO: 4.98 M/UL
RBC #/AREA URNS HPF: 6 /HPF (ref 0–4)
SODIUM SERPL-SCNC: 138 MMOL/L
SP GR UR STRIP: >=1.03 (ref 1–1.03)
TOXICOLOGY INFORMATION: NORMAL
TROPONIN I SERPL DL<=0.01 NG/ML-MCNC: <0.006 NG/ML
TSH SERPL DL<=0.005 MIU/L-ACNC: 1.99 UIU/ML
URN SPEC COLLECT METH UR: ABNORMAL
UROBILINOGEN UR STRIP-ACNC: NEGATIVE EU/DL
WBC # BLD AUTO: 8.5 K/UL
WBC #/AREA URNS HPF: 2 /HPF (ref 0–5)

## 2018-12-13 PROCEDURE — 63600175 PHARM REV CODE 636 W HCPCS: Performed by: EMERGENCY MEDICINE

## 2018-12-13 PROCEDURE — 82962 GLUCOSE BLOOD TEST: CPT

## 2018-12-13 PROCEDURE — 85025 COMPLETE CBC W/AUTO DIFF WBC: CPT

## 2018-12-13 PROCEDURE — 93005 ELECTROCARDIOGRAM TRACING: CPT

## 2018-12-13 PROCEDURE — 80053 COMPREHEN METABOLIC PANEL: CPT

## 2018-12-13 PROCEDURE — 80329 ANALGESICS NON-OPIOID 1 OR 2: CPT

## 2018-12-13 PROCEDURE — 84443 ASSAY THYROID STIM HORMONE: CPT

## 2018-12-13 PROCEDURE — 25000003 PHARM REV CODE 250: Performed by: EMERGENCY MEDICINE

## 2018-12-13 PROCEDURE — 96365 THER/PROPH/DIAG IV INF INIT: CPT

## 2018-12-13 PROCEDURE — 36011 PLACE CATHETER IN VEIN: CPT

## 2018-12-13 PROCEDURE — 96372 THER/PROPH/DIAG INJ SC/IM: CPT

## 2018-12-13 PROCEDURE — 80307 DRUG TEST PRSMV CHEM ANLYZR: CPT

## 2018-12-13 PROCEDURE — 83880 ASSAY OF NATRIURETIC PEPTIDE: CPT

## 2018-12-13 PROCEDURE — 80320 DRUG SCREEN QUANTALCOHOLS: CPT

## 2018-12-13 PROCEDURE — 84484 ASSAY OF TROPONIN QUANT: CPT

## 2018-12-13 PROCEDURE — 81000 URINALYSIS NONAUTO W/SCOPE: CPT | Mod: 59

## 2018-12-13 PROCEDURE — 36415 COLL VENOUS BLD VENIPUNCTURE: CPT

## 2018-12-13 PROCEDURE — 93010 ELECTROCARDIOGRAM REPORT: CPT | Mod: ,,, | Performed by: INTERNAL MEDICINE

## 2018-12-13 PROCEDURE — 99291 CRITICAL CARE FIRST HOUR: CPT | Mod: 25

## 2018-12-13 PROCEDURE — 96375 TX/PRO/DX INJ NEW DRUG ADDON: CPT

## 2018-12-13 PROCEDURE — 80048 BASIC METABOLIC PNL TOTAL CA: CPT

## 2018-12-13 PROCEDURE — 12000002 HC ACUTE/MED SURGE SEMI-PRIVATE ROOM

## 2018-12-13 RX ORDER — DIPHENHYDRAMINE HYDROCHLORIDE 50 MG/ML
50 INJECTION INTRAMUSCULAR; INTRAVENOUS
Status: COMPLETED | OUTPATIENT
Start: 2018-12-13 | End: 2018-12-13

## 2018-12-13 RX ORDER — SODIUM CHLORIDE 9 MG/ML
1000 INJECTION, SOLUTION INTRAVENOUS
Status: COMPLETED | OUTPATIENT
Start: 2018-12-13 | End: 2018-12-13

## 2018-12-13 RX ORDER — HALOPERIDOL 5 MG/ML
5 INJECTION INTRAMUSCULAR
Status: COMPLETED | OUTPATIENT
Start: 2018-12-13 | End: 2018-12-13

## 2018-12-13 RX ADMIN — CALCIUM GLUCONATE 1 G: 94 INJECTION, SOLUTION INTRAVENOUS at 11:12

## 2018-12-13 RX ADMIN — DEXTROSE MONOHYDRATE 25 G: 25 INJECTION, SOLUTION INTRAVENOUS at 11:12

## 2018-12-13 RX ADMIN — SODIUM CHLORIDE 1000 ML: 0.9 INJECTION, SOLUTION INTRAVENOUS at 09:12

## 2018-12-13 RX ADMIN — LORAZEPAM 2 MG: 2 INJECTION INTRAMUSCULAR; INTRAVENOUS at 05:12

## 2018-12-13 RX ADMIN — HALOPERIDOL LACTATE 5 MG: 5 INJECTION, SOLUTION INTRAMUSCULAR at 05:12

## 2018-12-13 RX ADMIN — DIPHENHYDRAMINE HYDROCHLORIDE 50 MG: 50 INJECTION, SOLUTION INTRAMUSCULAR; INTRAVENOUS at 05:12

## 2018-12-13 RX ADMIN — INSULIN HUMAN 10 UNITS: 100 INJECTION, SOLUTION PARENTERAL at 11:12

## 2018-12-13 NOTE — ED TRIAGE NOTES
[facility] Family reports pt paranoid schizophrenic, noncompliant with meds. Pt currently uncooperative with staff.Gregg DO, immediately to bedside in direct observation of pt.

## 2018-12-13 NOTE — ED PROVIDER NOTES
"Encounter Date: 12/13/2018    SCRIBE #1 NOTE: I, Marielena Vazquez, am scribing for, and in the presence of, Dr. Jaramillo.       History     Chief Complaint   Patient presents with    OPC     Pt brought in by [facility]. OPC states pt refuses  psych meds and is delusional and psychotic. PMH of schizophrenia. Pt refuses to answer any questions or cooperate in triage. Will not tell me his name.      Time seen by provider: 5:13 PM    This is a 68 y.o. male, with history of schizophrenia, [facility] states patient refuses his psych medications. Patient states he is "not at liberty to say" when he asked questions during ED visit. Patient's relative reports last hospitalization was 4 months ago and lasted for about seven days at . Patient's relative reports swelling in the lower extremities. Patient's relative reports patient lives on his own, but is checked on daily. Patient's relative reports he has not been compliant with medications and is not cooperative.      The history is provided by a relative and the patient. The history is limited by the condition of the patient.     Review of patient's allergies indicates:  No Known Allergies  Past Medical History:   Diagnosis Date    Arthritis     Edema     Hyperlipidemia     Schizophrenia, paranoid type     Venous insufficiency      Past Surgical History:   Procedure Laterality Date    colonoscopy N/A 12/29/2014    Performed by Nish Batista MD at [facility] ([address] [place] [address])    no sx       Family History   Problem Relation Age of Onset    Blindness Mother     Glaucoma Mother     Heart disease Mother     Edema Mother     Cataracts Father     Heart disease Father     Diabetes Father     Deep vein thrombosis Sister     No Known Problems Brother     No Known Problems Maternal Aunt     No Known Problems Maternal Uncle     No Known Problems Paternal Aunt     No Known Problems Paternal Uncle     No Known Problems Maternal Grandmother     No Known " Problems Maternal Grandfather     No Known Problems Paternal Grandmother     No Known Problems Paternal Grandfather     Amblyopia Neg Hx     Macular degeneration Neg Hx     Retinal detachment Neg Hx     Strabismus Neg Hx     Cancer Neg Hx     Hypertension Neg Hx     Stroke Neg Hx     Thyroid disease Neg Hx      Social History     Tobacco Use    Smoking status: Never Smoker    Smokeless tobacco: Never Used   Substance Use Topics    Alcohol use: No    Drug use: No     Review of Systems   Unable to perform ROS: Mental status change       Physical Exam     Initial Vitals [12/13/18 1705]   BP Pulse Resp Temp SpO2   (!) 179/83 (!) 112 18 -- 100 %      MAP       --         Physical Exam    Nursing note and vitals reviewed.  Constitutional: He appears well-developed and well-nourished.  Non-toxic appearance. He does not have a sickly appearance. No distress.   HENT:   Head: Normocephalic and atraumatic.   Mouth/Throat: Oropharynx is clear and moist.   Eyes: Conjunctivae, EOM and lids are normal. Pupils are equal, round, and reactive to light. Right eye exhibits no nystagmus. Left eye exhibits no nystagmus.   Neck: Trachea normal, normal range of motion and phonation normal. Neck supple.   Cardiovascular: Normal rate, regular rhythm and normal heart sounds. Exam reveals no gallop and no friction rub.    No murmur heard.  Pulmonary/Chest: Breath sounds normal. No respiratory distress. He has no wheezes. He has no rhonchi. He has no rales.   Abdominal: Soft. Normal appearance and bowel sounds are normal. There is no tenderness. There is no rigidity, no rebound, no guarding, no tenderness at McBurney's point and negative Bell's sign.   Musculoskeletal: Normal range of motion. He exhibits edema (Two to 3+ pitting edema bilaterally to the knee.).   Neurological: He is alert and oriented to person, place, and time. He has normal strength and normal reflexes. He displays normal reflexes. No cranial nerve deficit or  sensory deficit. He displays a negative Romberg sign.   Skin: Skin is warm, dry and intact. Capillary refill takes less than 2 seconds. No rash noted. No pallor.   Psychiatric: His affect is angry and blunt. His speech is rapid and/or pressured. He is agitated and actively hallucinating. Thought content is paranoid and delusional. He expresses impulsivity. He expresses no homicidal and no suicidal ideation.         ED Course   Critical Care  Date/Time: 12/13/2018 10:52 PM  Performed by: Amado Jaramillo DO  Authorized by: Amado Jaramillo DO   Direct patient critical care time: 10 minutes  Additional history critical care time: 8 minutes  Ordering / reviewing critical care time: 7 minutes  Documentation critical care time: 8 minutes  Consulting other physicians critical care time: 5 minutes  Total critical care time (exclusive of procedural time) : 38 minutes    External Jugular IV  Date/Time: 12/13/2018 11:36 PM  Performed by: Amado Jaramillo DO  Authorized by: Amado Jaramillo DO   Consent Done: Not Needed  Location (Ext Jugular): Right.  Catheter Size: 20 ga.  Catheter Type: Jelco.  Number of attempts: 1  Fixation/Dressing: Tegaderm.  Patient tolerance: Patient tolerated the procedure well with no immediate complications        Labs Reviewed   CBC W/ AUTO DIFFERENTIAL - Abnormal; Notable for the following components:       Result Value    Lymph # 0.6 (*)     Gran% 84.9 (*)     Lymph% 6.9 (*)     All other components within normal limits   COMPREHENSIVE METABOLIC PANEL - Abnormal; Notable for the following components:    Potassium 6.1 (*)     CO2 22 (*)     Glucose 124 (*)     BUN, Bld 32 (*)     Creatinine 1.9 (*)     Total Protein 8.5 (*)     AST 50 (*)     eGFR if  41 (*)     eGFR if non  35 (*)     All other components within normal limits   URINALYSIS, REFLEX TO URINE CULTURE - Abnormal; Notable for the following components:    Specific Gravity, UA >=1.030 (*)      Protein, UA 2+ (*)     Ketones, UA Trace (*)     Occult Blood UA 2+ (*)     All other components within normal limits    Narrative:     Preferred Collection Type->Urine, Clean Catch   ACETAMINOPHEN LEVEL - Abnormal; Notable for the following components:    Acetaminophen (Tylenol), Serum <3.0 (*)     All other components within normal limits   URINALYSIS MICROSCOPIC - Abnormal; Notable for the following components:    RBC, UA 6 (*)     All other components within normal limits    Narrative:     Preferred Collection Type->Urine, Clean Catch   BASIC METABOLIC PANEL - Abnormal; Notable for the following components:    Potassium 6.4 (*)     CO2 18 (*)     Glucose 111 (*)     BUN, Bld 31 (*)     Creatinine 1.7 (*)     eGFR if  47 (*)     eGFR if non  41 (*)     All other components within normal limits    Narrative:     K critical result(s) called and verbal readback obtained from   Clint Fischer MD. , 12/13/2018 22:42  Recoll. [id number] by PJ at 12/13/2018 22:02, reason: Specimen   hemolyzed. Notified to Migdalia @ED   TSH   DRUG SCREEN PANEL, URINE EMERGENCY    Narrative:     Preferred Collection Type->Urine, Clean Catch   ALCOHOL,MEDICAL (ETHANOL)   B-TYPE NATRIURETIC PEPTIDE   TROPONIN I   TROPONIN I   B-TYPE NATRIURETIC PEPTIDE   POCT GLUCOSE     EKG Readings: (Independently Interpreted)   Initial Reading: No STEMI.   Normal sinus rhythm. Rate of 87 bpm. No change from 11/25/2017.       Imaging Results          US Retroperitoneal Complete (In process)    Procedure changed from US Abdomen Pelvis Doppler Study Limited (retroperitoneal)                X-Ray Chest AP Portable (Final result)  Result time 12/13/18 22:55:59    Final result by Rome Stephenson MD (12/13/18 22:55:59)                 Impression:      Low lung volumes and bibasilar subsegmental atelectasis, but no definite acute finding identified on this limited single view.  Upright, inspiratory PA and lateral views  "could be helpful if there is persistent clinical concern.      Electronically signed by: Rome Stephenson MD  Date:    12/13/2018  Time:    22:55             Narrative:    EXAMINATION:  XR CHEST AP PORTABLE    CLINICAL HISTORY:  Provided history is "  Edema, unspecified".    TECHNIQUE:  One view of the chest.    COMPARISON:  11/25/2017.    FINDINGS:  Cardiac wires overlie the chest.  Lung volumes are low.  Bibasilar subsegmental atelectasis.  No focal consolidation.  No sizable pleural effusion.  No pneumothorax.                                 Medical Decision Making:   Clinical Tests:   Lab Tests: Ordered and Reviewed  Medical Tests: Ordered and Reviewed  ED Management:  A 68-year-old male known history of schizophrenia brought in by an OPC.  The patient has been having odd behavior not acting appropriately.  This has happened before when he does not take his medications.  History was obtained from the patient's brother-in-law.  The patient does live at home by himself.  Based on my examination the patient is gravely disabled and responding internal stimuli.  I do feel he needs to be placed on a PEC as he is unsafe to go home and does not have medical decision-making capacity.  The patient is non cooperative and will need to medically sedate him for the safety of the patient as well as the staff.      9:45 p.m. patient has a potassium of 6.1 with a creatinine of 1.9.  Baseline is 1.3.  No other acute abnormalities noted on the blood work.  Will give the patient IV fluids.  I do feel that the patient would necessitate admission for acute kidney injury do not feel I can medically clear him here in the emergency department.    Spoke with Hospital Medicine will admit the patient for further evaluation management.    This is the extent to the patients, or the patient's brother-in-law who brought him here along with the police, complaints today here in the emergency department.            Scribe Attestation:   Scribe #1: " I performed the above scribed service and the documentation accurately describes the services I performed. I attest to the accuracy of the note.    Attending Attestation:           Physician Attestation for Scribe:  Physician Attestation Statement for Scribe #1: I, Dr. Jaramillo, reviewed documentation, as scribed by Marielena Vazquez in my presence, and it is both accurate and complete.                    Clinical Impression:     1. SANJAY (acute kidney injury)    2. Hyperkalemia    3. Edema                                   Amado Jaramillo, DO  12/13/18 1985       Amado Jaramillo, DO  12/13/18 6367

## 2018-12-13 NOTE — ED NOTES
Pt uncooperative with medication administration but not combative, security at bedside for assistance

## 2018-12-13 NOTE — ED NOTES
Mateus (Brother in Law) [phone]  Deborah (sister) [phone] (House) [phone] (cell)

## 2018-12-14 VITALS
BODY MASS INDEX: 31.07 KG/M2 | OXYGEN SATURATION: 97 % | HEIGHT: 68 IN | SYSTOLIC BLOOD PRESSURE: 132 MMHG | HEART RATE: 67 BPM | TEMPERATURE: 98 F | WEIGHT: 205 LBS | DIASTOLIC BLOOD PRESSURE: 60 MMHG | RESPIRATION RATE: 16 BRPM

## 2018-12-14 PROBLEM — E87.5 HYPERKALEMIA: Status: ACTIVE | Noted: 2018-12-14

## 2018-12-14 PROBLEM — F20.9 SCHIZOPHRENIA: Status: ACTIVE | Noted: 2018-12-14

## 2018-12-14 PROBLEM — Z91.148 NON COMPLIANCE W MEDICATION REGIMEN: Status: ACTIVE | Noted: 2018-12-14

## 2018-12-14 PROBLEM — F23 ACUTE PSYCHOSIS: Status: ACTIVE | Noted: 2018-12-14

## 2018-12-14 PROBLEM — N18.30 CKD (CHRONIC KIDNEY DISEASE) STAGE 3, GFR 30-59 ML/MIN: Status: ACTIVE | Noted: 2018-12-14

## 2018-12-14 LAB
ALBUMIN SERPL BCP-MCNC: 3.6 G/DL
ALP SERPL-CCNC: 68 U/L
ALT SERPL W/O P-5'-P-CCNC: 28 U/L
ANION GAP SERPL CALC-SCNC: 13 MMOL/L
ANION GAP SERPL CALC-SCNC: 8 MMOL/L
ANION GAP SERPL CALC-SCNC: 9 MMOL/L
AST SERPL-CCNC: 45 U/L
BASOPHILS # BLD AUTO: 0.01 K/UL
BASOPHILS NFR BLD: 0.1 %
BILIRUB SERPL-MCNC: 0.7 MG/DL
BUN SERPL-MCNC: 27 MG/DL
BUN SERPL-MCNC: 27 MG/DL
BUN SERPL-MCNC: 31 MG/DL
CALCIUM SERPL-MCNC: 9.1 MG/DL
CALCIUM SERPL-MCNC: 9.3 MG/DL
CALCIUM SERPL-MCNC: 9.4 MG/DL
CHLORIDE SERPL-SCNC: 107 MMOL/L
CHLORIDE SERPL-SCNC: 108 MMOL/L
CHLORIDE SERPL-SCNC: 110 MMOL/L
CO2 SERPL-SCNC: 18 MMOL/L
CO2 SERPL-SCNC: 20 MMOL/L
CO2 SERPL-SCNC: 27 MMOL/L
CREAT SERPL-MCNC: 1.5 MG/DL
CREAT SERPL-MCNC: 1.7 MG/DL
CREAT SERPL-MCNC: 1.7 MG/DL
DIFFERENTIAL METHOD: ABNORMAL
EOSINOPHIL # BLD AUTO: 0.1 K/UL
EOSINOPHIL NFR BLD: 1.2 %
ERYTHROCYTE [DISTWIDTH] IN BLOOD BY AUTOMATED COUNT: 14 %
EST. GFR  (AFRICAN AMERICAN): 47 ML/MIN/1.73 M^2
EST. GFR  (AFRICAN AMERICAN): 47 ML/MIN/1.73 M^2
EST. GFR  (AFRICAN AMERICAN): 55 ML/MIN/1.73 M^2
EST. GFR  (NON AFRICAN AMERICAN): 41 ML/MIN/1.73 M^2
EST. GFR  (NON AFRICAN AMERICAN): 41 ML/MIN/1.73 M^2
EST. GFR  (NON AFRICAN AMERICAN): 47 ML/MIN/1.73 M^2
ESTIMATED AVG GLUCOSE: 108 MG/DL
GLUCOSE SERPL-MCNC: 100 MG/DL
GLUCOSE SERPL-MCNC: 111 MG/DL
GLUCOSE SERPL-MCNC: 90 MG/DL
HBA1C MFR BLD HPLC: 5.4 %
HCT VFR BLD AUTO: 42.5 %
HGB BLD-MCNC: 13.9 G/DL
LYMPHOCYTES # BLD AUTO: 0.9 K/UL
LYMPHOCYTES NFR BLD: 13.8 %
MAGNESIUM SERPL-MCNC: 2.3 MG/DL
MCH RBC QN AUTO: 30.6 PG
MCHC RBC AUTO-ENTMCNC: 32.7 G/DL
MCV RBC AUTO: 94 FL
MONOCYTES # BLD AUTO: 0.8 K/UL
MONOCYTES NFR BLD: 11.5 %
NEUTROPHILS # BLD AUTO: 5 K/UL
NEUTROPHILS NFR BLD: 73.3 %
PHOSPHATE SERPL-MCNC: 2.9 MG/DL
PLATELET # BLD AUTO: 143 K/UL
PMV BLD AUTO: 9.9 FL
POTASSIUM SERPL-SCNC: 4 MMOL/L
POTASSIUM SERPL-SCNC: 4 MMOL/L
POTASSIUM SERPL-SCNC: 6.4 MMOL/L
PROT SERPL-MCNC: 6.7 G/DL
RBC # BLD AUTO: 4.54 M/UL
SODIUM SERPL-SCNC: 138 MMOL/L
SODIUM SERPL-SCNC: 139 MMOL/L
SODIUM SERPL-SCNC: 143 MMOL/L
WBC # BLD AUTO: 6.76 K/UL

## 2018-12-14 PROCEDURE — G0425 INPT/ED TELECONSULT30: HCPCS | Mod: GT,,, | Performed by: NURSE PRACTITIONER

## 2018-12-14 PROCEDURE — 83036 HEMOGLOBIN GLYCOSYLATED A1C: CPT

## 2018-12-14 PROCEDURE — 84100 ASSAY OF PHOSPHORUS: CPT

## 2018-12-14 PROCEDURE — 85025 COMPLETE CBC W/AUTO DIFF WBC: CPT

## 2018-12-14 PROCEDURE — 99223 1ST HOSP IP/OBS HIGH 75: CPT | Mod: ,,, | Performed by: INTERNAL MEDICINE

## 2018-12-14 PROCEDURE — 80048 BASIC METABOLIC PNL TOTAL CA: CPT

## 2018-12-14 PROCEDURE — 94761 N-INVAS EAR/PLS OXIMETRY MLT: CPT

## 2018-12-14 PROCEDURE — 83735 ASSAY OF MAGNESIUM: CPT

## 2018-12-14 PROCEDURE — 80053 COMPREHEN METABOLIC PANEL: CPT

## 2018-12-14 PROCEDURE — 36415 COLL VENOUS BLD VENIPUNCTURE: CPT

## 2018-12-14 RX ORDER — ACETAMINOPHEN 325 MG/1
650 TABLET ORAL EVERY 4 HOURS PRN
Refills: 0 | COMMUNITY
Start: 2018-12-14 | End: 2019-08-28

## 2018-12-14 RX ORDER — ONDANSETRON 8 MG/1
8 TABLET, ORALLY DISINTEGRATING ORAL EVERY 8 HOURS PRN
Status: DISCONTINUED | OUTPATIENT
Start: 2018-12-14 | End: 2018-12-14 | Stop reason: HOSPADM

## 2018-12-14 RX ORDER — HEPARIN SODIUM 5000 [USP'U]/ML
5000 INJECTION, SOLUTION INTRAVENOUS; SUBCUTANEOUS EVERY 8 HOURS
Status: DISCONTINUED | OUTPATIENT
Start: 2018-12-14 | End: 2018-12-14 | Stop reason: HOSPADM

## 2018-12-14 RX ORDER — ACETAMINOPHEN 325 MG/1
650 TABLET ORAL EVERY 4 HOURS PRN
Status: DISCONTINUED | OUTPATIENT
Start: 2018-12-14 | End: 2018-12-14 | Stop reason: HOSPADM

## 2018-12-14 RX ORDER — FUROSEMIDE 20 MG/1
20 TABLET ORAL DAILY
Status: DISCONTINUED | OUTPATIENT
Start: 2018-12-14 | End: 2018-12-14 | Stop reason: HOSPADM

## 2018-12-14 RX ORDER — SODIUM CHLORIDE 0.9 % (FLUSH) 0.9 %
5 SYRINGE (ML) INJECTION
Status: DISCONTINUED | OUTPATIENT
Start: 2018-12-14 | End: 2018-12-14 | Stop reason: HOSPADM

## 2018-12-14 RX ORDER — FUROSEMIDE 20 MG/1
20 TABLET ORAL DAILY
Qty: 30 TABLET | Refills: 11 | Status: ON HOLD
Start: 2018-12-14 | End: 2019-08-29 | Stop reason: HOSPADM

## 2018-12-14 RX ORDER — ONDANSETRON 8 MG/1
8 TABLET, ORALLY DISINTEGRATING ORAL EVERY 8 HOURS PRN
Start: 2018-12-14 | End: 2019-08-28

## 2018-12-14 NOTE — PLAN OF CARE
12/14/18 1154   Discharge Assessment   Assessment Type Discharge Planning Assessment   Confirmed/corrected address and phone number on facesheet? Yes   Assessment information obtained from? Patient;Caregiver;Medical Record   Communicated expected length of stay with patient/caregiver yes   Prior to hospitilization cognitive status: Not Oriented to Time   Prior to hospitalization functional status: Assistive Equipment   Current cognitive status: Not Oriented to Time   Current Functional Status: Assistive Equipment;Needs Assistance   Lives With other relative(s)   Able to Return to Prior Arrangements no   Is patient able to care for self after discharge? No   Do you have any problems affording any of your prescribed medications? No   Is the patient taking medications as prescribed? no   If no, which medications is patient not taking? anti-psychotic meds   Does the patient have transportation home? Yes   Discharge Plan A Psychiatric hospital  (PEC'd)   Patient/Family in Agreement with Plan yes

## 2018-12-14 NOTE — PLAN OF CARE
[facility]   Department of Hospital Medicine  [address] [place] [id number]  [phone]       Facility Transfer Orders                        12/14/2018    Oral Gonzales    Admit to: Psychiatric Hospital    Diagnoses:  Active Hospital Problems    Diagnosis  POA    *SANJAY (acute kidney injury) [N17.9]  Yes    Hyperkalemia [E87.5]  Yes    Schizophrenia [F20.9]  Yes    Non compliance w medication regimen [Z91.14]  Not Applicable    Hyperlipidemia [E78.5]  Yes     Chronic      Resolved Hospital Problems   No resolved problems to display.       Allergies:Review of patient's allergies indicates:  No Known Allergies    Vitals:       Every shift     Diet: Renal diet   No NSAIDS    Nursing Precautions:          CONSULTS:      Nutrition to evaluate and recommend diet      LABS:  Per chery-psyc protocol        Medications:    Oral Gonzales   Home Medication Instructions RONNI:[id number]    Printed on:12/14/18 1324   Medication Information                      acetaminophen (TYLENOL) 325 MG tablet  Take 2 tablets (650 mg total) by mouth every 4 (four) hours as needed.             furosemide (LASIX) 20 MG tablet  Take 1 tablet (20 mg total) by mouth once daily.             ondansetron (ZOFRAN-ODT) 8 MG TbDL  Take 1 tablet (8 mg total) by mouth every 8 (eight) hours as needed.                               _________________________________  Dr Vinson  12/14/2018

## 2018-12-14 NOTE — CONSULTS
Consult Note  Nephrology    Consult Requested By: Birdie Vinson MD  Reason for Consult: SANJAY    SUBJECTIVE:     History of Present Illness:  Patient is a 68 y.o. male presents with delusional and schizophrenic.  Brought in by [facility] and no family around.  Workup in emergency room revealed mild acute kidney injury.  Extensive medical history as outlined below including CKD 3 with variable baseline of about 1.5 over the last few years.  Had mild hyperkalemia but was hemolyzed.  Became uncooperative and had to be chemically sedated last night.  Consult of this morning for evaluation.    Patient seen and examined in remains sedated.  Discussed with treatment team.    Epic reviewed.    Past Medical History:   Diagnosis Date    Arthritis     CKD (chronic kidney disease), stage III     Edema     Hyperlipidemia     Schizophrenia, paranoid type     Venous insufficiency      Past Surgical History:   Procedure Laterality Date    colonoscopy N/A 12/29/2014    Performed by [facility] at [facility] ([facility])    no sx       Family History   Problem Relation Age of Onset    Blindness Mother     Glaucoma Mother     Heart disease Mother     Edema Mother     Cataracts Father     Heart disease Father     Diabetes Father     Deep vein thrombosis Sister     No Known Problems Brother     No Known Problems Maternal Aunt     No Known Problems Maternal Uncle     No Known Problems Paternal Aunt     No Known Problems Paternal Uncle     No Known Problems Maternal Grandmother     No Known Problems Maternal Grandfather     No Known Problems Paternal Grandmother     No Known Problems Paternal Grandfather     Amblyopia Neg Hx     Macular degeneration Neg Hx     Retinal detachment Neg Hx     Strabismus Neg Hx     Cancer Neg Hx     Hypertension Neg Hx     Stroke Neg Hx     Thyroid disease Neg Hx      Social History     Tobacco Use    Smoking status: Never Smoker    Smokeless tobacco: Never Used   Substance  Use Topics    Alcohol use: No    Drug use: No       Review of patient's allergies indicates:  No Known Allergies     Review of Systems:    Unable to assess      OBJECTIVE:     Vital Signs (Most Recent)  Temp: 97 °F (36.1 °C) (12/14/18 0007)  Pulse: 67 (12/14/18 0824)  Resp: 16 (12/14/18 0824)  BP: 128/73 (12/14/18 0755)  SpO2: 99 % (12/14/18 0824)    Vital Signs Range (Last 24H):  Temp:  [97 °F (36.1 °C)]   Pulse:  []   Resp:  [15-20]   BP: (115-179)/(58-83)   SpO2:  [95 %-100 %]     No intake or output data in the 24 hours ending 12/14/18 1015    Physical Exam:  General appearance: Well developed, well nourished  Eyes:  Conjunctivae/corneas clear. PERRL.  Lungs: Normal respiratory effort,   clear to auscultation bilaterally   Heart: Regular rate and rhythm, S1, S2 normal, no murmur, rub or cynthia.  Abdomen: Soft, non-tender non-distended; bowel sounds normal; no masses,  no organomegaly  Extremities: No cyanosis or clubbing. 3+ edema.    Skin: Skin color, texture, turgor normal. No rashes or lesions  Neurologic: sedate       Laboratory:  Recent Labs   Lab 12/14/18  0637   WBC 6.76   RBC 4.54*   HGB 13.9*   HCT 42.5   *   MCV 94   MCH 30.6   MCHC 32.7     BMP:   Recent Labs   Lab 12/14/18  0637         K 4.0      CO2 27   BUN 27*   CREATININE 1.7*   CALCIUM 9.3   MG 2.3     Lab Results   Component Value Date    CALCIUM 9.3 12/14/2018    PHOS 2.9 12/14/2018     BNP  Recent Labs   Lab 12/13/18  2201   BNP <10   No results found for: URICACIDNo results found for: IRON, TIBC, FERRITIN, SATURATEDIRO  Lab Results   Component Value Date    CALCIUM 9.3 12/14/2018    PHOS 2.9 12/14/2018       Diagnostic Results:  US Retroperitoneal Complete   Final Result      No evidence of hydronephrosis or other acute abnormality.         Electronically signed by: Lauren Porter MD   Date:    12/13/2018   Time:    23:57      X-Ray Chest AP Portable   Final Result      Low lung volumes and bibasilar  subsegmental atelectasis, but no definite acute finding identified on this limited single view.  Upright, inspiratory PA and lateral views could be helpful if there is persistent clinical concern.         Electronically signed by: Rome Stephenson MD   Date:    12/13/2018   Time:    22:55          ASSESSMENT/PLAN:     1. Stable mild a KI on CKD 3 with baseline creatinine about 1.5 likely secondary to volume depletion (N17.9, N18.3):  Ultrasound unremarkable and numbers improved with IV hydration.  Stable from renal standpoint for transfer to mental facility.  Avoid NSAIDs or other nephrotoxic agents.  Recommend follow-up with [facility] nephrology as outpatient.  Nothing else to offer from renal standpoint.Renally dose meds, avoid nephrotoxins, and monitor I/O's closely.  2. Labile schizophrenia (F20.9):  Needs inpatient facility.      Thanks for consultation  See above  Will sign off  Call with questions or concerns

## 2018-12-14 NOTE — NURSING
Pt to be discharged to [facility]. Report given to nurse at this time. Pt off unit via stretcher with [facility]. Pt belongings released from security. All belongings and pt off unit at this time. No acute distress noted.

## 2018-12-14 NOTE — ED NOTES
Pt resting in bed with eyes closed, respirations even and unlabored, appears in no acute distress. IV fluids infusing. Bed in lowest, locked position, side rails up x 2. Danelle Escobedo. remains in direct obs of pt.

## 2018-12-14 NOTE — ED NOTES
LOC: The patient is awake, alert and aware of environment with a flat affect, the patient is oriented to person, and non compliant/ argumentative  APPEARANCE: Patient resting comfortably and in no acute distress, patient is clean and well groomed, patient's clothing is properly fastened.  SKIN: The skin is warm and dry, patient has normal skin turgor and moist mucus membranes, skin intact, no breakdown or brusing noted.  MUSKULOSKELETAL: Patient moving all extremities well, no obvious swelling or deformities noted.  RESPIRATORY: Airway is open and patent, respirations are spontaneous, patient has a normal effort and rate. Breath sounds are clear and equal bilaterally.  CARDIAC: Normal heart sounds. Bilateral LL +2 pitting edema

## 2018-12-14 NOTE — ED NOTES
Pt resting in bed with eyes closed, respirations even and unlabored, appears in no acute distress. Remains in paper gown. Side rails up x 2. Sitter Danelle BARRAGAN remains in direct observation of pt.

## 2018-12-14 NOTE — ED NOTES
Hourly rounding completed. Pt with eyes closed, lying in stretcher, snoring noted. Visible rise and fall of chest noted. Side rails up x 2 for pt safety.

## 2018-12-14 NOTE — NURSING
Pt admitted from ed PEC. Sitter present.  Pt drowsy and respond to name.  Safety intact. Room environment checked call light removed.  nad noted at present.  obs continues.  nad noted inst to sitter to call for assistance.

## 2018-12-14 NOTE — ASSESSMENT & PLAN NOTE
- has not been taking medications per EMS  - refuses to answer questions   - states no SI/HI  - s/p B52 upon arrival to unit  - PEC'd  - telepsych consult ordered   - monitor

## 2018-12-14 NOTE — ASSESSMENT & PLAN NOTE
- patient seems to have a poor fund of knowledge regarding importance of medication compliance   - patient has not been taking psych meds at home reportedly

## 2018-12-14 NOTE — DISCHARGE SUMMARY
"[facility] Hospital Medicine  Discharge Summary      Patient Name: Oral Gonzales  MRN: [id number]  Admission Date: 12/13/2018  Hospital Length of Stay: 1 days  Discharge Date and Time:  12/14/2018 3:30 PM  Attending Physician: Birdie Vinson MD   Discharging Provider: Birdie Vinson MD  Primary Care Provider: Dickson Laurent MD      HPI:   Mr. Oral Gonzales is a 68 y.o. male, with PMH of schizophrenia, who was brought in by [facility] as he was found inadequately clothed sitting in front of his home. Per ED note, he "refuses his psych medications. Patient states he is "not at liberty to say" when he asked questions during ED visit. Patient's relative reports last hospitalization was 4 months ago and lasted for about seven days at . Patient's relative reports swelling in the lower extremities. Patient's relative reports patient lives on his own, but is checked on daily. Patient's relative reports he has not been compliant with medications and is not cooperative." He was evaluated in the ED, and labs showed SANJAY. He was admitted to inpatient status for SANJAY workup and Psych clearance.             Hospital Course:   His potassium was high on admit which improved with treatment.The patient has ckd with creatinine as high as 1.5 last year.His creatinine was stable.He did have edema in his legs , bnp was normal.Patient denied shortness of breath , pain, difficulty urinating.Urinalysis showed proteinuria.He was started on lasix 20 mg daily.He was advised not to take nsaids and will need follow with nephrology likely at [facility] as outpatient after dc from inpatient behavioral facility.Tele psych was consulted and recommended inpatient behavioral facility where he was discharged.     Consults:   Consults (From admission, onward)        Status Ordering Provider     [facility]     Provider:  Edgardo Nolasco NP    Completed LILIANE SQUIRES     Inpatient consult to " Telemedicine - Psyc  Once     Provider:  Eduardo Howell III, NP    Completed LILIANE SQUIRES          No new Assessment & Plan notes have been filed under this hospital service since the last note was generated.  Service: Hospital Medicine    Final Active Diagnoses:    Diagnosis Date Noted POA    PRINCIPAL PROBLEM:  SANJAY (acute kidney injury) [N17.9] 12/13/2018 Yes    Hyperkalemia [E87.5] 12/14/2018 Yes    Schizophrenia [F20.9] 12/14/2018 Yes    Non compliance w medication regimen [Z91.14] 12/14/2018 Not Applicable    CKD (chronic kidney disease) stage 3, GFR 30-59 ml/min [N18.3] 12/14/2018 Yes    Hyperlipidemia [E78.5] 08/06/2012 Yes     Chronic      Problems Resolved During this Admission:       Discharged Condition: stable    Disposition: Rehab Facility    Follow Up:  Follow-up Information     Dickson Laurent MD In 2 weeks.    Specialty:  Internal Medicine  Why:  After your discharged from [facility].  Contact information:  [address]  [place] [id number] [phone]             Go to Elidia Almonte MD.    Specialty:  Nephrology  Why:  After your dischrged from [facility].  Contact information:  [address] [facility] [id number] [phone]             [facility].    Why:  Psych  Contact information:  SERGEY Verma               Patient Instructions:      Diet  Renal   Activity as tolerated       Significant Diagnostic Studies:   BMP  Lab Results   Component Value Date     12/14/2018    K 4.0 12/14/2018     12/14/2018    CO2 27 12/14/2018    BUN 27 (H) 12/14/2018    CREATININE 1.7 (H) 12/14/2018    CALCIUM 9.3 12/14/2018    ANIONGAP 8 12/14/2018    ESTGFRAFRICA 47 (A) 12/14/2018    EGFRNONAA 41 (A) 12/14/2018         Pending Diagnostic Studies:     None         Medications:  Reconciled Home Medications:      Medication List      START taking these medications    acetaminophen 325 MG tablet  Commonly known as:  TYLENOL  Take 2 tablets (650 mg total) by mouth  every 4 (four) hours as needed.     furosemide 20 MG tablet  Commonly known as:  LASIX  Take 1 tablet (20 mg total) by mouth once daily.     ondansetron 8 MG Tbdl  Commonly known as:  ZOFRAN-ODT  Take 1 tablet (8 mg total) by mouth every 8 (eight) hours as needed.            Indwelling Lines/Drains at time of discharge:   Lines/Drains/Airways          None          Time spent on the discharge of patient: 35  minutes  Patient was seen and examined on the date of discharge and determined to be suitable for discharge.         Birdie Vinson MD  Department of Hospital Medicine  [facility]

## 2018-12-14 NOTE — PLAN OF CARE
Discharge Planning:  Patient admitted on 12-13-18  LOS-day 1    Chart reviewed, Care plan discussed  Discussed care plan with treatment team,  attending Dr Vinson  Consults following are: psychiatry, case mgt.      Current dispo: PEC'd and OPC'd  Mr Gonzales is medically cleared per Dr Vinson  Clinicals shared via [facility]  Await transfer orders  Transportation: non-emergent ambulance    Case management  to follow        [facility] accepted   3 P  Nurse report, [phone], option 2  [facility], [phone]

## 2018-12-14 NOTE — PLAN OF CARE
12/14/18 1346   Final Note   Assessment Type Final Discharge Note   Anticipated Discharge Disposition Psych  ([facility])   Hospital Follow Up  Appt(s) scheduled? Yes   Discharge plans and expectations educations in teach back method with documentation complete? Yes   Right Care Referral Info   Post Acute Recommendation Other   Referral Type Psychiatric Hospital   Facility Name [facility] [place]

## 2018-12-14 NOTE — PLAN OF CARE
Problem: Adult Inpatient Plan of Care  Goal: Plan of Care Review  Outcome: Ongoing (interventions implemented as appropriate)  Pt rested comfortably.  Nad noted. Safety intact. Pt has sitter present monitoring q15min.  No changes noted. Remains on telemetry.  Saline locks benign and rt ej intact.  obs continues.

## 2018-12-14 NOTE — ED NOTES
Pt resting in bed with eyes closed, respirations even and unlabored, appears in no acute distress. Luis Escobedo, remains in direct observation of pt.

## 2018-12-14 NOTE — CONSULTS
"Tele-Consultation to Emergency Department from Psychiatry    Please see previous notes:    Patient agreeable to consultation via telepsychiatry.    Consultation started: 12/14/2018 at 1100  The chief complaint leading to psychiatric consultation is: schizophrenic, non-compliant with meds  This consultation was requested by Dr. Vinson, the Emergency Department attending physician.  The location of the consulting psychiatrist is [address] [place] [address].  The patient location is [facility] medical floor.  The patient arrived at the ED at: 1707 on 12/13   Also present with the patient at the time of the consultation: tyler    Patient Identification:  Oral Gonzales is a 68 y.o. male.    Patient information was obtained from patient.  Patient presented involuntarily to the Emergency Department [facility]    History of Present Illness:    Pt is a 68 y.o. Male with PMHx of schizophrenia who yesterday was brought to ED via OPC initiated family.  OPC indicates that he refused to take his medications. Pt was transferred to medical for SANJAY workup and medical clearance for psychiatric placement.  Psychiatric evaluation conducted via telemed.  Pt appeared guarded during interview and stated, "I don't know" to most questions.  He is oriented to person, place, and month.  Thought processes appear paranoid.  Unable to name his psych medication but acknowledges that he has not been taking them.  Currently denies SI/HI/AVH.  Patient's relative reports patient lives on his own.  Reports last hospitalization 4 months ago and lasted 7-days at .      Unsuccessfully attempted to call his sister for collateral history, Aminah [phone].  No answer and no available voice mail.      Psychiatric History:   Hospitalization: Yes, multiple, last at   Medication Trials: Yes  Suicide Attempts: unkown  Violence: unkown  Depression: unkown  María: unkown  AH's: unkown  Delusions: unkown    Review of Systems   Unable to " perform ROS: Mental status change     Past Medical History:   Past Medical History:   Diagnosis Date    Arthritis     CKD (chronic kidney disease), stage III     Edema     Hyperlipidemia     Schizophrenia, paranoid type     Venous insufficiency       Seizures: no  Head trauma/l.o.c.: no  Wish to become pregnant[if female of childbearing age]: NA    Allergies: see yasmin  Review of patient's allergies indicates:  No Known Allergies    Medications in ER:   Medications   sodium chloride 0.9% flush 5 mL (not administered)   heparin (porcine) injection 5,000 Units ( Subcutaneous Canceled Entry 12/14/18 0600)   acetaminophen tablet 650 mg (not administered)   ondansetron disintegrating tablet 8 mg (not administered)   haloperidol lactate injection 5 mg (5 mg Intramuscular Given 12/13/18 1743)   diphenhydrAMINE injection 50 mg (50 mg Intramuscular Given 12/13/18 1743)   lorazepam (ATIVAN) injection 2 mg (2 mg Intramuscular Given 12/13/18 1742)   0.9%  NaCl infusion (1,000 mLs Intravenous New Bag 12/13/18 2151)   calcium gluconate 1g in dextrose 5% 100mL (ready to mix system) (1 g Intravenous New Bag 12/13/18 2319)   insulin regular injection 10 Units (10 Units Intravenous Given 12/13/18 2319)   dextrose 50% injection 25 g (25 g Intravenous Given 12/13/18 2318)     Medications at home: unkown    Substance Abuse History:   Alchohol: unkown  Drug: unkown    Legal History:   Past charges/incarcerations: unkown  Pending charges: unkown    Family Psychiatric History: unkown    Social History:   History of Physical/Sexual Abuse: unkown  Employment/Disability: disabled  Financial: disability  Relationship Status/Sexual Orientation: single   Children: reports 2 daughter  Housing Status: lives alone   History: not assessed  Recreational Activities: not assessed  Access to Gun: denies    Current Evaluation:     Constitutional  Vitals:  Vitals:    12/13/18 2204 12/13/18 2214 12/13/18 2224 12/13/18 2237   BP:   (!) 148/78     Pulse: 78 84 84 80   Resp: 19 18 20 15   Temp:       TempSrc:       SpO2: 97% 99% 96% 99%   Weight:       Height:        12/13/18 2254 12/13/18 2256 12/13/18 2327 12/14/18 0007   BP: (!) 115/58   134/73   Pulse:  82 82 85   Resp:  18 20 18   Temp:    97 °F (36.1 °C)   TempSrc:    Axillary   SpO2:  97% 97% 99%   Weight:       Height:        12/14/18 0011 12/14/18 0200 12/14/18 0400 12/14/18 0700   BP:       Pulse: 82 73 70 73   Resp:       Temp:       TempSrc:       SpO2:       Weight:       Height:        12/14/18 0755 12/14/18 0824 12/14/18 1000   BP: 128/73     Pulse: 80 67 62   Resp:  16    Temp:      TempSrc:      SpO2:  99%    Weight:      Height:         General:  unremarkable, age appropriate     Musculoskeletal  Muscle Strength/Tone:   moving arms normally   Gait & Station:   sitting on stretcher     Psychiatric  Level of Consciousness: alert  Orientation: oriented to person, place and month  Grooming: in hospital gown  Psychomotor Behavior: no agitation  Speech: normal in rate, rhythm and volume  Language: uses words appropriately  Mood: guarded  Affect: restricted  Thought Process: appears paranoid  Associations: unable to assess  Thought Content: currently denies SI/HI/AVH  Memory: unable to assess  Attention: intact to interview  Fund of Knowledge: unable to assess  Insight: poor  Judgement: poor    Relevant Elements of Neurological Exam: no abnormality of posture noted    Assessment - Diagnosis - Goals:     Diagnosis/Impression: Schizophrenia, chronic paranoid type    Recommendations:   · Once medically cleared seek involuntary inpatient psychiatric admission for stabilization of acute psychiatric symptoms.     · Obtain records from  to supplement history.     · Legal - Seek/continue PEC because pt is gravely disabled  ·   · Psych Meds - PRN Zyprexa 5 mg q 6hr PO/IM for non-redirectable agitation; do not combine or administer within one hour of giving a benzodiazepine.     · Risperidone Sublingual  0.5 mg po BID with plan to consider switching to [place] Consta injection to improve compliance.    Time with patient: 20 minutes    More than 50% of the time was spent counseling/coordinating care    Laboratory Data:   Labs Reviewed   CBC W/ AUTO DIFFERENTIAL - Abnormal; Notable for the following components:       Result Value    Lymph # 0.6 (*)     Gran% 84.9 (*)     Lymph% 6.9 (*)     All other components within normal limits   COMPREHENSIVE METABOLIC PANEL - Abnormal; Notable for the following components:    Potassium 6.1 (*)     CO2 22 (*)     Glucose 124 (*)     BUN, Bld 32 (*)     Creatinine 1.9 (*)     Total Protein 8.5 (*)     AST 50 (*)     eGFR if  41 (*)     eGFR if non  35 (*)     All other components within normal limits   URINALYSIS, REFLEX TO URINE CULTURE - Abnormal; Notable for the following components:    Specific Gravity, UA >=1.030 (*)     Protein, UA 2+ (*)     Ketones, UA Trace (*)     Occult Blood UA 2+ (*)     All other components within normal limits    Narrative:     Preferred Collection Type->Urine, Clean Catch   ACETAMINOPHEN LEVEL - Abnormal; Notable for the following components:    Acetaminophen (Tylenol), Serum <3.0 (*)     All other components within normal limits   URINALYSIS MICROSCOPIC - Abnormal; Notable for the following components:    RBC, UA 6 (*)     All other components within normal limits    Narrative:     Preferred Collection Type->Urine, Clean Catch   BASIC METABOLIC PANEL - Abnormal; Notable for the following components:    Potassium 6.4 (*)     CO2 18 (*)     Glucose 111 (*)     BUN, Bld 31 (*)     Creatinine 1.7 (*)     eGFR if  47 (*)     eGFR if non  41 (*)     All other components within normal limits    Narrative:     K critical result(s) called and verbal readback obtained from   Clint Fischer MD. , 12/13/2018 22:42  Recoll. [id number] by YULIANA at 12/13/2018 22:02, reason: Specimen   hemolyzed. Notified  to Migdalia @ED   TSH   DRUG SCREEN PANEL, URINE EMERGENCY    Narrative:     Preferred Collection Type->Urine, Clean Catch   ALCOHOL,MEDICAL (ETHANOL)   B-TYPE NATRIURETIC PEPTIDE   TROPONIN I   TROPONIN I   B-TYPE NATRIURETIC PEPTIDE   POCT GLUCOSE       Consulting clinician was informed of the encounter and consult note.    Consultation ended: 12/14/2018 at 1130

## 2018-12-14 NOTE — HOSPITAL COURSE
His potassium was high on admit which improved with treatment.The patient has ckd with creatinine as high as 1.5 last year.His creatinine was stable.He did have edema in his legs , bnp was normal.Patient denied shortness of breath , pain, difficulty urinating.Urinalysis showed proteinuria.He was started on lasix 20 mg daily.He was advised not to take nsaids and will need follow with nephrology likely at [facility] as outpatient after dc from inpatient behavioral facility.Tele psych was consulted and recommended inpatient behavioral facility where he was discharged.

## 2018-12-14 NOTE — ASSESSMENT & PLAN NOTE
- Mr. Oral Gonzales is admitted to in patient status   - appears to have SANJAY on unknown baseline  - Per his renal function is reduced, and he denies taking ibuprofen daily  - retroperitoneal US without acute findings   - SANJAY studies pending  - monitor

## 2018-12-14 NOTE — HPI
"Mr. Oral Gonzales is a 68 y.o. male, with PMH of schizophrenia, who was brought in by [facility] as he was found inadequately clothed sitting in front of his home. Per ED note, he "refuses his psych medications. Patient states he is "not at liberty to say" when he asked questions during ED visit. Patient's relative reports last hospitalization was 4 months ago and lasted for about seven days at . Patient's relative reports swelling in the lower extremities. Patient's relative reports patient lives on his own, but is checked on daily. Patient's relative reports he has not been compliant with medications and is not cooperative." He was evaluated in the ED, and labs showed SANJAY. He was admitted to inpatient status for SANJAY workup and Psych clearance.         "

## 2018-12-14 NOTE — ED NOTES
Pt resting in bed with eyes closed, respirations even and unlabored, appears in no acute distress. Danelle Escobedo remains in direct obs of pt. Bed in lowest locked position, side rails up x 2.

## 2018-12-14 NOTE — NURSING
Pt resting quietly. Still sleeping at present. Sitter at bedside and inst when pt voids urine sample is needed. Urinal at bedside.

## 2018-12-14 NOTE — ED NOTES
Pt belongings consist of:  3 shirts  1 pair jeans  1 pair of slippers    1 brown wallet  100 dollar bills x 5  2 dollar bills x 2  1 visa card  Mult misc cards    Ticket number for valuables from security- # [id number]  Belongings given to security

## 2018-12-14 NOTE — SUBJECTIVE & OBJECTIVE
Past Medical History:   Diagnosis Date    Arthritis     Edema     Hyperlipidemia     Schizophrenia, paranoid type     Venous insufficiency        Past Surgical History:   Procedure Laterality Date    colonoscopy N/A 12/29/2014    [facility]    no sx         Review of patient's allergies indicates:  No Known Allergies    No current facility-administered medications on file prior to encounter.      No current outpatient medications on file prior to encounter.     Family History     Problem Relation (Age of Onset)    Blindness Mother    Cataracts Father    Deep vein thrombosis Sister    Diabetes Father    Edema Mother    Glaucoma Mother    Heart disease Mother, Father    No Known Problems Brother, Maternal Aunt, Maternal Uncle, Paternal Aunt, Paternal Uncle, Maternal Grandmother, Maternal Grandfather, Paternal Grandmother, Paternal Grandfather        Tobacco Use    Smoking status: Never Smoker    Smokeless tobacco: Never Used   Substance and Sexual Activity    Alcohol use: No    Drug use: No    Sexual activity: Not on file     Review of Systems   Unable to perform ROS: Psychiatric disorder     Objective:     Vital Signs (Most Recent):  Temp: 97 °F (36.1 °C) (12/14/18 0007)  Pulse: 70 (12/14/18 0400)  Resp: 18 (12/14/18 0007)  BP: 134/73 (12/14/18 0007)  SpO2: 99 % (12/14/18 0007) Vital Signs (24h Range):  Temp:  [97 °F (36.1 °C)] 97 °F (36.1 °C)  Pulse:  [] 70  Resp:  [15-20] 18  SpO2:  [95 %-100 %] 99 %  BP: (115-179)/(58-83) 134/73     Weight: 93 kg (205 lb)  Body mass index is 31.17 kg/m².    Physical Exam   Constitutional: Vital signs are normal. He appears well-developed and well-nourished.  Non-toxic appearance. He does not have a sickly appearance. He does not appear ill. No distress.   HENT:   Head: Normocephalic and atraumatic.   Right Ear: External ear normal.   Left Ear: External ear normal.   Eyes: Conjunctivae and EOM are normal. Pupils are  equal, round, and reactive to light. No scleral icterus.   Neck: Normal range of motion. Neck supple. No JVD present. No tracheal deviation present.   Cardiovascular: Normal rate, regular rhythm, normal heart sounds and intact distal pulses. Exam reveals no gallop and no friction rub.   No murmur heard.  Pulmonary/Chest: Effort normal and breath sounds normal. No stridor. No respiratory distress. He has no wheezes. He has no rales.   Abdominal: Soft. Bowel sounds are normal. He exhibits no distension and no mass. There is no tenderness. There is no guarding.   Musculoskeletal: He exhibits no edema.   Neurological: He is alert. Coordination normal.   Skin: Skin is warm and dry. He is not diaphoretic.   Psychiatric: He has a normal mood and affect. His behavior is normal. Judgment and thought content normal.   Nursing note and vitals reviewed.        CRANIAL NERVES     CN III, IV, VI   Pupils are equal, round, and reactive to light.  Extraocular motions are normal.        Significant Labs:   BMP:   Recent Labs   Lab 12/14/18  0217   GLU 90      K 4.0      CO2 20*   BUN 27*   CREATININE 1.5*   CALCIUM 9.1     CBC:   Recent Labs   Lab 12/13/18 1858   WBC 8.50   HGB 15.3   HCT 46.0        CMP:   Recent Labs   Lab 12/13/18 1858 12/13/18  2218 12/14/18  0217    138 139   K 6.1* 6.4* 4.0    107 110   CO2 22* 18* 20*   * 111* 90   BUN 32* 31* 27*   CREATININE 1.9* 1.7* 1.5*   CALCIUM 10.2 9.4 9.1   PROT 8.5*  --  6.7   ALBUMIN 4.6  --  3.6   BILITOT 0.7  --  0.7   ALKPHOS 87  --  68   AST 50*  --  45*   ALT 39  --  28   ANIONGAP 13 13 9   EGFRNONAA 35* 41* 47*     TSH:   Recent Labs   Lab 12/13/18 1858   TSH 1.987     Urine Culture: No results for input(s): LABURIN in the last 48 hours.  Urine Studies:   Recent Labs   Lab 12/13/18 2103   COLORU Yellow   APPEARANCEUA Clear   PHUR 5.0   SPECGRAV >=1.030*   PROTEINUA 2+*   GLUCUA Negative   KETONESU Trace*   BILIRUBINUA Negative    OCCULTUA 2+*   NITRITE Negative   UROBILINOGEN Negative   LEUKOCYTESUR Negative   RBCUA 6*   WBCUA 2   BACTERIA Occasional   HYALINECASTS 1     All pertinent labs within the past 24 hours have been reviewed.    Significant Imaging: I have reviewed all pertinent imaging results/findings within the past 24 hours.   Imaging Results          US Retroperitoneal Complete (Final result)  Result time 12/13/18 23:57:00   Procedure changed from US Abdomen Pelvis Doppler Study Limited (retroperitoneal)     Final result by Lauren Porter MD (12/13/18 23:57:00)                 Impression:      No evidence of hydronephrosis or other acute abnormality.      Electronically signed by: Lauren Porter MD  Date:    12/13/2018  Time:    23:57             Narrative:    EXAMINATION:  US RETROPERITONEAL COMPLETE    CLINICAL HISTORY:  SANJAY; Acute kidney failure, unspecified    TECHNIQUE:  Ultrasound of the kidneys and urinary bladder was performed including color flow and Doppler evaluation of the kidneys.    Please note that imaging examination is slightly limited due to patient's inability to tolerate the exam and comply with breath hold instructions.    COMPARISON:  None.    FINDINGS:  Right kidney: The right kidney measures 9.4 x 4.4 x 4 pronounced cm.  There is no loss of corticomedullary distinction or increased renal echogenicity.  Resistive index measures 0.63.  No renal stone. No hydronephrosis.    Left kidney: The left kidney measures 11.0 x 5.9 x 5.3 cm. There is slightly limited evaluation of the left kidney due to technical factors detailed above.  Resistive index measures 0.79.  No renal stone. No hydronephrosis.    The bladder is partially distended at the time of scanning and has an unremarkable appearance.                               X-Ray Chest AP Portable (Final result)  Result time 12/13/18 22:55:59    Final result by Rome Stephenson MD (12/13/18 22:55:59)                 Impression:      Low lung volumes and  "bibasilar subsegmental atelectasis, but no definite acute finding identified on this limited single view.  Upright, inspiratory PA and lateral views could be helpful if there is persistent clinical concern.      Electronically signed by: Rome Stephenson MD  Date:    12/13/2018  Time:    22:55             Narrative:    EXAMINATION:  XR CHEST AP PORTABLE    CLINICAL HISTORY:  Provided history is "  Edema, unspecified".    TECHNIQUE:  One view of the chest.    COMPARISON:  11/25/2017.    FINDINGS:  Cardiac wires overlie the chest.  Lung volumes are low.  Bibasilar subsegmental atelectasis.  No focal consolidation.  No sizable pleural effusion.  No pneumothorax.                               "

## 2018-12-14 NOTE — NURSING
Pt refused to void in urinal use commode.  Pt has flat affect and blunt answers and poor eye contact.  Refused heparin injection.  States 'no'.  Will attempt to collect urine sample again.

## 2019-08-28 ENCOUNTER — HOSPITAL ENCOUNTER (INPATIENT)
Facility: OTHER | Age: 69
LOS: 1 days | Discharge: PSYCHIATRIC OR SUBSTANCE USE FACILITY | DRG: 640 | End: 2019-08-29
Attending: EMERGENCY MEDICINE | Admitting: HOSPITALIST
Payer: MEDICARE

## 2019-08-28 DIAGNOSIS — N18.9 ACUTE KIDNEY INJURY SUPERIMPOSED ON CKD: ICD-10-CM

## 2019-08-28 DIAGNOSIS — E87.0 HYPERNATREMIA: Primary | ICD-10-CM

## 2019-08-28 DIAGNOSIS — F23 ACUTE PSYCHOSIS: ICD-10-CM

## 2019-08-28 DIAGNOSIS — N17.9 AKI (ACUTE KIDNEY INJURY): ICD-10-CM

## 2019-08-28 DIAGNOSIS — N17.9 ACUTE KIDNEY INJURY SUPERIMPOSED ON CKD: ICD-10-CM

## 2019-08-28 LAB
ALBUMIN SERPL BCP-MCNC: 4.6 G/DL (ref 3.5–5.2)
ALP SERPL-CCNC: 84 U/L (ref 55–135)
ALT SERPL W/O P-5'-P-CCNC: 30 U/L (ref 10–44)
AMPHET+METHAMPHET UR QL: NEGATIVE
ANION GAP SERPL CALC-SCNC: 19 MMOL/L (ref 8–16)
APAP SERPL-MCNC: <3 UG/ML (ref 10–20)
AST SERPL-CCNC: 40 U/L (ref 10–40)
BACTERIA #/AREA URNS HPF: ABNORMAL /HPF
BARBITURATES UR QL SCN>200 NG/ML: NEGATIVE
BASOPHILS # BLD AUTO: 0.03 K/UL (ref 0–0.2)
BASOPHILS NFR BLD: 0.4 % (ref 0–1.9)
BENZODIAZ UR QL SCN>200 NG/ML: NEGATIVE
BILIRUB SERPL-MCNC: 0.8 MG/DL (ref 0.1–1)
BILIRUB UR QL STRIP: ABNORMAL
BUN SERPL-MCNC: 44 MG/DL (ref 8–23)
BZE UR QL SCN: NEGATIVE
CALCIUM SERPL-MCNC: 10.2 MG/DL (ref 8.7–10.5)
CANNABINOIDS UR QL SCN: NEGATIVE
CHLORIDE SERPL-SCNC: 113 MMOL/L (ref 95–110)
CLARITY UR: ABNORMAL
CO2 SERPL-SCNC: 19 MMOL/L (ref 23–29)
COLOR UR: YELLOW
CREAT SERPL-MCNC: 2.2 MG/DL (ref 0.5–1.4)
CREAT UR-MCNC: >450 MG/DL (ref 23–375)
DIFFERENTIAL METHOD: ABNORMAL
EOSINOPHIL # BLD AUTO: 0.1 K/UL (ref 0–0.5)
EOSINOPHIL NFR BLD: 0.6 % (ref 0–8)
ERYTHROCYTE [DISTWIDTH] IN BLOOD BY AUTOMATED COUNT: 13.8 % (ref 11.5–14.5)
EST. GFR  (AFRICAN AMERICAN): 34 ML/MIN/1.73 M^2
EST. GFR  (NON AFRICAN AMERICAN): 29 ML/MIN/1.73 M^2
ETHANOL SERPL-MCNC: <10 MG/DL
GLUCOSE SERPL-MCNC: 125 MG/DL (ref 70–110)
GLUCOSE UR QL STRIP: NEGATIVE
HCT VFR BLD AUTO: 45.6 % (ref 40–54)
HGB BLD-MCNC: 14.9 G/DL (ref 14–18)
HGB UR QL STRIP: ABNORMAL
HYALINE CASTS #/AREA URNS LPF: 2 /LPF
IMM GRANULOCYTES # BLD AUTO: 0.02 K/UL (ref 0–0.04)
IMM GRANULOCYTES NFR BLD AUTO: 0.2 % (ref 0–0.5)
KETONES UR QL STRIP: ABNORMAL
LEUKOCYTE ESTERASE UR QL STRIP: NEGATIVE
LYMPHOCYTES # BLD AUTO: 1.3 K/UL (ref 1–4.8)
LYMPHOCYTES NFR BLD: 15.5 % (ref 18–48)
MCH RBC QN AUTO: 30.1 PG (ref 27–31)
MCHC RBC AUTO-ENTMCNC: 32.7 G/DL (ref 32–36)
MCV RBC AUTO: 92 FL (ref 82–98)
METHADONE UR QL SCN>300 NG/ML: NEGATIVE
MICROSCOPIC COMMENT: ABNORMAL
MONOCYTES # BLD AUTO: 0.9 K/UL (ref 0.3–1)
MONOCYTES NFR BLD: 10.2 % (ref 4–15)
NEUTROPHILS # BLD AUTO: 6.2 K/UL (ref 1.8–7.7)
NEUTROPHILS NFR BLD: 73.1 % (ref 38–73)
NITRITE UR QL STRIP: NEGATIVE
NRBC BLD-RTO: 0 /100 WBC
OPIATES UR QL SCN: NEGATIVE
PCP UR QL SCN>25 NG/ML: NEGATIVE
PH UR STRIP: 5 [PH] (ref 5–8)
PLATELET # BLD AUTO: 172 K/UL (ref 150–350)
PMV BLD AUTO: 9.3 FL (ref 9.2–12.9)
POTASSIUM SERPL-SCNC: 4.1 MMOL/L (ref 3.5–5.1)
PROT SERPL-MCNC: 8.2 G/DL (ref 6–8.4)
PROT UR QL STRIP: ABNORMAL
RBC # BLD AUTO: 4.95 M/UL (ref 4.6–6.2)
RBC #/AREA URNS HPF: 3 /HPF (ref 0–4)
SODIUM SERPL-SCNC: 151 MMOL/L (ref 136–145)
SP GR UR STRIP: >=1.03 (ref 1–1.03)
SQUAMOUS #/AREA URNS HPF: 6 /HPF
TOXICOLOGY INFORMATION: ABNORMAL
TSH SERPL DL<=0.005 MIU/L-ACNC: 0.72 UIU/ML (ref 0.4–4)
URN SPEC COLLECT METH UR: ABNORMAL
UROBILINOGEN UR STRIP-ACNC: NEGATIVE EU/DL
WBC # BLD AUTO: 8.44 K/UL (ref 3.9–12.7)
WBC #/AREA URNS HPF: 1 /HPF (ref 0–5)

## 2019-08-28 PROCEDURE — 80053 COMPREHEN METABOLIC PANEL: CPT

## 2019-08-28 PROCEDURE — 36415 COLL VENOUS BLD VENIPUNCTURE: CPT

## 2019-08-28 PROCEDURE — 63600175 PHARM REV CODE 636 W HCPCS: Performed by: EMERGENCY MEDICINE

## 2019-08-28 PROCEDURE — 80320 DRUG SCREEN QUANTALCOHOLS: CPT

## 2019-08-28 PROCEDURE — 81000 URINALYSIS NONAUTO W/SCOPE: CPT | Mod: 59

## 2019-08-28 PROCEDURE — 76937 US GUIDE VASCULAR ACCESS: CPT

## 2019-08-28 PROCEDURE — 80329 ANALGESICS NON-OPIOID 1 OR 2: CPT

## 2019-08-28 PROCEDURE — 80307 DRUG TEST PRSMV CHEM ANLYZR: CPT

## 2019-08-28 PROCEDURE — 99223 PR INITIAL HOSPITAL CARE,LEVL III: ICD-10-PCS | Mod: ,,, | Performed by: NURSE PRACTITIONER

## 2019-08-28 PROCEDURE — 11000001 HC ACUTE MED/SURG PRIVATE ROOM

## 2019-08-28 PROCEDURE — 63600175 PHARM REV CODE 636 W HCPCS: Performed by: NURSE PRACTITIONER

## 2019-08-28 PROCEDURE — 94761 N-INVAS EAR/PLS OXIMETRY MLT: CPT

## 2019-08-28 PROCEDURE — 99223 1ST HOSP IP/OBS HIGH 75: CPT | Mod: ,,, | Performed by: NURSE PRACTITIONER

## 2019-08-28 PROCEDURE — 99285 EMERGENCY DEPT VISIT HI MDM: CPT

## 2019-08-28 PROCEDURE — 85025 COMPLETE CBC W/AUTO DIFF WBC: CPT

## 2019-08-28 PROCEDURE — 80048 BASIC METABOLIC PNL TOTAL CA: CPT

## 2019-08-28 PROCEDURE — 84443 ASSAY THYROID STIM HORMONE: CPT

## 2019-08-28 RX ORDER — SODIUM CHLORIDE 9 MG/ML
1000 INJECTION, SOLUTION INTRAVENOUS
Status: COMPLETED | OUTPATIENT
Start: 2019-08-28 | End: 2019-08-28

## 2019-08-28 RX ORDER — SODIUM CHLORIDE 0.9 % (FLUSH) 0.9 %
10 SYRINGE (ML) INJECTION
Status: DISCONTINUED | OUTPATIENT
Start: 2019-08-28 | End: 2019-08-29 | Stop reason: HOSPADM

## 2019-08-28 RX ORDER — ONDANSETRON 8 MG/1
8 TABLET, ORALLY DISINTEGRATING ORAL EVERY 8 HOURS PRN
Status: DISCONTINUED | OUTPATIENT
Start: 2019-08-28 | End: 2019-08-29 | Stop reason: HOSPADM

## 2019-08-28 RX ORDER — ACETAMINOPHEN 325 MG/1
650 TABLET ORAL EVERY 4 HOURS PRN
Status: DISCONTINUED | OUTPATIENT
Start: 2019-08-28 | End: 2019-08-29 | Stop reason: HOSPADM

## 2019-08-28 RX ORDER — SODIUM CHLORIDE 9 MG/ML
INJECTION, SOLUTION INTRAVENOUS CONTINUOUS
Status: DISCONTINUED | OUTPATIENT
Start: 2019-08-28 | End: 2019-08-29 | Stop reason: HOSPADM

## 2019-08-28 RX ORDER — HEPARIN SODIUM 5000 [USP'U]/ML
5000 INJECTION, SOLUTION INTRAVENOUS; SUBCUTANEOUS EVERY 8 HOURS
Status: DISCONTINUED | OUTPATIENT
Start: 2019-08-28 | End: 2019-08-29 | Stop reason: HOSPADM

## 2019-08-28 RX ADMIN — SODIUM CHLORIDE 1000 ML: 0.9 INJECTION, SOLUTION INTRAVENOUS at 07:08

## 2019-08-28 RX ADMIN — HEPARIN SODIUM 5000 UNITS: 5000 INJECTION, SOLUTION INTRAVENOUS; SUBCUTANEOUS at 09:08

## 2019-08-28 RX ADMIN — SODIUM CHLORIDE: 0.9 INJECTION, SOLUTION INTRAVENOUS at 09:08

## 2019-08-28 NOTE — ED NOTES
No change in status.  Saray Escobedo at bedside in direct line of site observation of pt for safety.  Placed in paper gown and covered with warm blanket.

## 2019-08-28 NOTE — ED TRIAGE NOTES
Awake and mumbling, laughing inappropriately at things he seems to be saying to no one in particular.  Respirations even and unlabored.  Refuses to allow staff to put on paper scrubs.  Dr. Jaramillo at bedside.  Came to ED with OPC in place.

## 2019-08-28 NOTE — ED NOTES
Pt changed into paper scrubs, witnessed by ED staff, wanded by security. Belongings collected, security with security and listed below.     Brown sandals  Khaki pants  Socks  Undies  White tshirt  Blue tshirt
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
## 2019-08-28 NOTE — ED NOTES
Blood drawn from left AC per ANGEL Forrester; tolerated well.  Pt still whispers and then laughs.  Brother-in-law at bedside, stating that he has been walking in the rain and/or lying in wet grass.  Called [facility] for OPC.

## 2019-08-29 VITALS
WEIGHT: 206.13 LBS | BODY MASS INDEX: 31.34 KG/M2 | DIASTOLIC BLOOD PRESSURE: 70 MMHG | TEMPERATURE: 98 F | OXYGEN SATURATION: 95 % | RESPIRATION RATE: 20 BRPM | SYSTOLIC BLOOD PRESSURE: 157 MMHG | HEART RATE: 54 BPM

## 2019-08-29 PROBLEM — N18.9 ACUTE KIDNEY INJURY SUPERIMPOSED ON CKD: Status: RESOLVED | Noted: 2018-12-13 | Resolved: 2019-08-29

## 2019-08-29 PROBLEM — N17.9 ACUTE KIDNEY INJURY SUPERIMPOSED ON CKD: Status: RESOLVED | Noted: 2018-12-13 | Resolved: 2019-08-29

## 2019-08-29 PROBLEM — F29 PSYCHOSIS: Status: ACTIVE | Noted: 2019-08-29

## 2019-08-29 PROBLEM — E87.0 HYPERNATREMIA: Status: RESOLVED | Noted: 2019-08-28 | Resolved: 2019-08-29

## 2019-08-29 PROBLEM — F20.0 PARANOID SCHIZOPHRENIA: Status: ACTIVE | Noted: 2019-08-29

## 2019-08-29 PROBLEM — F29 PSYCHOSIS: Status: RESOLVED | Noted: 2019-08-29 | Resolved: 2019-08-29

## 2019-08-29 LAB
ANION GAP SERPL CALC-SCNC: 11 MMOL/L (ref 8–16)
ANION GAP SERPL CALC-SCNC: 9 MMOL/L (ref 8–16)
BASOPHILS # BLD AUTO: 0.03 K/UL (ref 0–0.2)
BASOPHILS NFR BLD: 0.5 % (ref 0–1.9)
BUN SERPL-MCNC: 32 MG/DL (ref 8–23)
BUN SERPL-MCNC: 37 MG/DL (ref 8–23)
CALCIUM SERPL-MCNC: 8.9 MG/DL (ref 8.7–10.5)
CALCIUM SERPL-MCNC: 8.9 MG/DL (ref 8.7–10.5)
CHLORIDE SERPL-SCNC: 115 MMOL/L (ref 95–110)
CHLORIDE SERPL-SCNC: 115 MMOL/L (ref 95–110)
CHOLEST SERPL-MCNC: 230 MG/DL (ref 120–199)
CHOLEST/HDLC SERPL: 5.5 {RATIO} (ref 2–5)
CO2 SERPL-SCNC: 23 MMOL/L (ref 23–29)
CO2 SERPL-SCNC: 24 MMOL/L (ref 23–29)
CREAT SERPL-MCNC: 1.5 MG/DL (ref 0.5–1.4)
CREAT SERPL-MCNC: 1.8 MG/DL (ref 0.5–1.4)
DIFFERENTIAL METHOD: ABNORMAL
EOSINOPHIL # BLD AUTO: 0.1 K/UL (ref 0–0.5)
EOSINOPHIL NFR BLD: 2 % (ref 0–8)
ERYTHROCYTE [DISTWIDTH] IN BLOOD BY AUTOMATED COUNT: 13.8 % (ref 11.5–14.5)
EST. GFR  (AFRICAN AMERICAN): 43 ML/MIN/1.73 M^2
EST. GFR  (AFRICAN AMERICAN): 54 ML/MIN/1.73 M^2
EST. GFR  (NON AFRICAN AMERICAN): 38 ML/MIN/1.73 M^2
EST. GFR  (NON AFRICAN AMERICAN): 47 ML/MIN/1.73 M^2
GLUCOSE SERPL-MCNC: 104 MG/DL (ref 70–110)
GLUCOSE SERPL-MCNC: 107 MG/DL (ref 70–110)
HCT VFR BLD AUTO: 44.4 % (ref 40–54)
HDLC SERPL-MCNC: 42 MG/DL (ref 40–75)
HDLC SERPL: 18.3 % (ref 20–50)
HGB BLD-MCNC: 13.8 G/DL (ref 14–18)
IMM GRANULOCYTES # BLD AUTO: 0.02 K/UL (ref 0–0.04)
IMM GRANULOCYTES NFR BLD AUTO: 0.3 % (ref 0–0.5)
LDLC SERPL CALC-MCNC: 156.2 MG/DL (ref 63–159)
LYMPHOCYTES # BLD AUTO: 1.1 K/UL (ref 1–4.8)
LYMPHOCYTES NFR BLD: 18.8 % (ref 18–48)
MAGNESIUM SERPL-MCNC: 2.7 MG/DL (ref 1.6–2.6)
MCH RBC QN AUTO: 29.6 PG (ref 27–31)
MCHC RBC AUTO-ENTMCNC: 31.1 G/DL (ref 32–36)
MCV RBC AUTO: 95 FL (ref 82–98)
MONOCYTES # BLD AUTO: 0.6 K/UL (ref 0.3–1)
MONOCYTES NFR BLD: 9.2 % (ref 4–15)
NEUTROPHILS # BLD AUTO: 4.2 K/UL (ref 1.8–7.7)
NEUTROPHILS NFR BLD: 69.2 % (ref 38–73)
NONHDLC SERPL-MCNC: 188 MG/DL
NRBC BLD-RTO: 0 /100 WBC
PHOSPHATE SERPL-MCNC: 2.4 MG/DL (ref 2.7–4.5)
PLATELET # BLD AUTO: 155 K/UL (ref 150–350)
PMV BLD AUTO: 9.4 FL (ref 9.2–12.9)
POTASSIUM SERPL-SCNC: 4 MMOL/L (ref 3.5–5.1)
POTASSIUM SERPL-SCNC: 4.3 MMOL/L (ref 3.5–5.1)
RBC # BLD AUTO: 4.67 M/UL (ref 4.6–6.2)
SODIUM SERPL-SCNC: 148 MMOL/L (ref 136–145)
SODIUM SERPL-SCNC: 149 MMOL/L (ref 136–145)
TRIGL SERPL-MCNC: 159 MG/DL (ref 30–150)
WBC # BLD AUTO: 6.06 K/UL (ref 3.9–12.7)

## 2019-08-29 PROCEDURE — 99238 PR HOSPITAL DISCHARGE DAY,<30 MIN: ICD-10-PCS | Mod: ,,, | Performed by: INTERNAL MEDICINE

## 2019-08-29 PROCEDURE — 94761 N-INVAS EAR/PLS OXIMETRY MLT: CPT

## 2019-08-29 PROCEDURE — 84100 ASSAY OF PHOSPHORUS: CPT

## 2019-08-29 PROCEDURE — 36415 COLL VENOUS BLD VENIPUNCTURE: CPT

## 2019-08-29 PROCEDURE — 85025 COMPLETE CBC W/AUTO DIFF WBC: CPT

## 2019-08-29 PROCEDURE — 83735 ASSAY OF MAGNESIUM: CPT

## 2019-08-29 PROCEDURE — 63600175 PHARM REV CODE 636 W HCPCS: Performed by: NURSE PRACTITIONER

## 2019-08-29 PROCEDURE — 80048 BASIC METABOLIC PNL TOTAL CA: CPT

## 2019-08-29 PROCEDURE — 99238 HOSP IP/OBS DSCHRG MGMT 30/<: CPT | Mod: ,,, | Performed by: INTERNAL MEDICINE

## 2019-08-29 PROCEDURE — 80061 LIPID PANEL: CPT

## 2019-08-29 RX ADMIN — HEPARIN SODIUM 5000 UNITS: 5000 INJECTION, SOLUTION INTRAVENOUS; SUBCUTANEOUS at 05:08

## 2019-08-29 RX ADMIN — SODIUM CHLORIDE: 0.9 INJECTION, SOLUTION INTRAVENOUS at 05:08

## 2019-08-29 NOTE — ED NOTES
Shift Report to ANGEL Slaughter. Joe at bedside attempting to start IV. Pt continues to mumble to self. Cooperative at this time with staff. Meal tray at bedside. Crissy Escobedo at bedside for direct obs

## 2019-08-29 NOTE — PLAN OF CARE
Patient was not available.   spoke with nurse and attempted to visit patient as requested by nurse.

## 2019-08-29 NOTE — SUBJECTIVE & OBJECTIVE
Interval History:  Pt seen and examined at bedside. He has been intermittently agitated, spitting at staff. Upon my evaluation he would not open eyes or respond to voice, although he did resist my exam. No family at bedside.    Review of Systems   Unable to perform ROS: Psychiatric disorder     Objective:     Vital Signs (Most Recent):  Temp: 98.6 °F (37 °C) (08/29/19 0736)  Pulse: (!) 58 (08/29/19 0736)  Resp: 20 (08/29/19 0736)  BP: (!) 144/70 (08/29/19 0736)  SpO2: 98 % (08/29/19 0736) Vital Signs (24h Range):  Temp:  [97.7 °F (36.5 °C)-99.1 °F (37.3 °C)] 98.6 °F (37 °C)  Pulse:  [58-95] 58  Resp:  [18-20] 20  SpO2:  [95 %-98 %] 98 %  BP: (144-175)/(70-87) 144/70     Weight: 93.5 kg (206 lb 2.1 oz)  Body mass index is 31.34 kg/m².    Intake/Output Summary (Last 24 hours) at 8/29/2019 1202  Last data filed at 8/29/2019 0736  Gross per 24 hour   Intake 10 ml   Output 200 ml   Net -190 ml      Physical Exam   Constitutional: He appears well-developed and well-nourished. No distress.   Lying in bed, appears comfortable   HENT:   Head: Normocephalic and atraumatic.   Eyes:   Unable to assess - pt uncooperative   Cardiovascular: Normal rate, regular rhythm and intact distal pulses.   Pulmonary/Chest: Effort normal and breath sounds normal. No respiratory distress.   Abdominal: Soft. Bowel sounds are normal. He exhibits no distension. There is no tenderness.   Musculoskeletal: He exhibits no edema.   Neurological:   Unable to assess - pt uncooperative   Skin: Skin is warm and dry.   Psychiatric:   Unable to assess   Nursing note and vitals reviewed.      Significant Labs:   BMP:   Recent Labs   Lab 08/29/19 0529      *   K 4.3   *   CO2 24   BUN 32*   CREATININE 1.5*   CALCIUM 8.9   MG 2.7*     CBC:   Recent Labs   Lab 08/28/19  1743 08/29/19  0529   WBC 8.44 6.06   HGB 14.9 13.8*   HCT 45.6 44.4    155     Urine Studies:   Recent Labs   Lab 08/28/19  1755   COLORU Yellow   APPEARANCEUA Hazy*    PHUR 5.0   SPECGRAV >=1.030*   PROTEINUA 2+*   GLUCUA Negative   KETONESU 1+*   BILIRUBINUA 1+*   OCCULTUA 2+*   NITRITE Negative   UROBILINOGEN Negative   LEUKOCYTESUR Negative   RBCUA 3   WBCUA 1   BACTERIA Occasional   SQUAMEPITHEL 6   HYALINECASTS 2*     All pertinent labs within the past 24 hours have been reviewed.    Significant Imaging: I have reviewed all pertinent imaging results/findings within the past 24 hours.

## 2019-08-29 NOTE — ED NOTES
Pt AAOx4, resting comfortably and calmly in bed, NAD, respirations E/UL, updated on POC, wheels locked and in low position. All items removed from room. Comfort positioning and restroom needs were addressed. Necessary items were placed with in reach and was advised when a reassessment would take place. Will continue to monitor    Magaly Escobedo, at bedside for direct observation doing q15 min checks

## 2019-08-29 NOTE — HOSPITAL COURSE
Patient admitted for SANJAY on CKD due to poor PO intake and sitting out in the sun. He had psychiatric history and had been displaying unusual behaviors lately so he was PEC'd. He was started on IVF and had improvement in renal function and sodium levels. He was accepted to psychiatric facility for treatment of acute psychosis and paranoid schizophrenia

## 2019-08-29 NOTE — PLAN OF CARE
ADT30 completed for stretcher transport to [facility] - requested  time of 1:45pm
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
## 2019-08-29 NOTE — NURSING
PEC'd patient given verbal plan of care from moving from [facility] to [facility]. Report given to Simona @ [phone].  Patient dressed in paper scrubs for transport to Psych facility.  IV removed without complication; pressure bandage wrapped.  Security brought patient's belongings to bedside before departure.  Case Management has spoken to family and all paperwork faxed to [facility].

## 2019-08-29 NOTE — PLAN OF CARE
08/29/19 1332   Final Note   Assessment Type Final Discharge Note   Anticipated Discharge Disposition Psych  ([facility])   What phone number can be called within the next 1-3 days to see how you are doing after discharge?   ([phone])   Hospital Follow Up  Appt(s) scheduled? No   Discharge plans and expectations educations in teach back method with documentation complete? No

## 2019-08-29 NOTE — PLAN OF CARE
"Problem: Self-Care Deficit  Goal: Improved Ability to Complete Activities of Daily Living    Intervention: Promote Activity and Functional Liberty     08/29/19 0512   Promote Activity and Functional Liberty   Activity Assistance Provided assistance, 1 person   Identify and Manage Contributors to Fall Injury Risk   Self-Care Promotion independence encouraged         Comments: Patient alert and sating good in RA. IV fluids started and IV access secured. Patient appears to be constantly mumbling and talking to no one in particular. Able to follow commands for only a short period of time,  remains disoriented. Side rails up x3, sitter at bedside. Offered water, patient responded "No!". Safety maintained, kept door open. External catheter in place. Slept in bed throughout the night. No events noted. Vitals WNL.  "

## 2019-08-29 NOTE — PLAN OF CARE
[facility]     08/29/19 1558   Final Note   Assessment Type Final Discharge Note   Anticipated Discharge Disposition Psych   What phone number can be called within the next 1-3 days to see how you are doing after discharge?   ([phone])   Hospital Follow Up  Appt(s) scheduled? No   Discharge plans and expectations educations in teach back method with documentation complete? No

## 2019-08-29 NOTE — ED NOTES
Received bedside report from ANGEL Duffy  Pt AAOx4, resting comfortably and calmly in bed, NAD, respirations E/UL, updated on POC, wheels locked and in low position. All items removed from room. Comfort positioning and restroom needs were addressed. Necessary items were placed with in reach and was advised when a reassessment would take place. Will continue to monitor    Crissy Escobedo, at bedside for direct observation doing q15 min checks

## 2019-08-29 NOTE — SUBJECTIVE & OBJECTIVE
Past Medical History:   Diagnosis Date    Arthritis     CKD (chronic kidney disease), stage III     Edema     Hyperlipidemia     Schizophrenia, paranoid type     Venous insufficiency        Past Surgical History:   Procedure Laterality Date    colonoscopy N/A 12/29/2014    Performed by [facility] at [facility] ([address] [place] [address])    no sx         Review of patient's allergies indicates:  No Known Allergies    No current facility-administered medications on file prior to encounter.      Current Outpatient Medications on File Prior to Encounter   Medication Sig    furosemide (LASIX) 20 MG tablet Take 1 tablet (20 mg total) by mouth once daily.    [DISCONTINUED] acetaminophen (TYLENOL) 325 MG tablet Take 2 tablets (650 mg total) by mouth every 4 (four) hours as needed.    [DISCONTINUED] ondansetron (ZOFRAN-ODT) 8 MG TbDL Take 1 tablet (8 mg total) by mouth every 8 (eight) hours as needed.     Family History     Problem Relation (Age of Onset)    Blindness Mother    Cataracts Father    Deep vein thrombosis Sister    Diabetes Father    Edema Mother    Glaucoma Mother    Heart disease Mother, Father    No Known Problems Brother, Maternal Aunt, Maternal Uncle, Paternal Aunt, Paternal Uncle, Maternal Grandmother, Maternal Grandfather, Paternal Grandmother, Paternal Grandfather        Tobacco Use    Smoking status: Never Smoker    Smokeless tobacco: Never Used   Substance and Sexual Activity    Alcohol use: No    Drug use: No    Sexual activity: Not on file     Review of Systems   Unable to perform ROS: Psychiatric disorder     Objective:     Vital Signs (Most Recent):  Temp: 97.7 °F (36.5 °C) (08/28/19 2110)  Pulse: 78 (08/28/19 2110)  Resp: 18 (08/28/19 2110)  BP: (!) 175/79 (08/28/19 2110)  SpO2: 96 % (08/28/19 2139) Vital Signs (24h Range):  Temp:  [97.7 °F (36.5 °C)-99.1 °F (37.3 °C)] 97.7 °F (36.5 °C)  Pulse:  [78-95] 78  Resp:  [18-20] 18  SpO2:  [95 %-97 %] 96 %  BP: (165-175)/(79-87) 175/79      Weight: 95.3 kg (210 lb 1.6 oz)  Body mass index is 31.95 kg/m².    Physical Exam   Constitutional: He appears well-developed and well-nourished. He appears lethargic.   HENT:   Head: Normocephalic.   Eyes: Conjunctivae are normal.   Neck: Normal range of motion. Neck supple.   Cardiovascular: Normal rate, regular rhythm, normal heart sounds and intact distal pulses.   Pulses:       Radial pulses are 2+ on the right side, and 2+ on the left side.   Pulmonary/Chest: Effort normal. He has decreased breath sounds in the right lower field and the left lower field.   Abdominal: Soft. He exhibits no distension. Bowel sounds are increased. There is no tenderness.   Musculoskeletal: Normal range of motion.   Neurological: He appears lethargic. GCS eye subscore is 2. GCS verbal subscore is 4. GCS motor subscore is 5.   Skin: Skin is warm and dry.   Psychiatric: He is withdrawn. Thought content is paranoid. Cognition and memory are impaired. He is noncommunicative.           Significant Labs:   CBC:   Recent Labs   Lab 08/28/19  1743   WBC 8.44   HGB 14.9   HCT 45.6        CMP:   Recent Labs   Lab 08/28/19  1743   *   K 4.1   *   CO2 19*   *   BUN 44*   CREATININE 2.2*   CALCIUM 10.2   PROT 8.2   ALBUMIN 4.6   BILITOT 0.8   ALKPHOS 84   AST 40   ALT 30   ANIONGAP 19*   EGFRNONAA 29*       Significant Imaging: I have reviewed all pertinent imaging results/findings within the past 24 hours.

## 2019-08-29 NOTE — PROGRESS NOTES
DIANNE notified nearest relative listed on PEC, Mateus Clemens, brother in law [phone] and informed him that pt was transferred to [facility] and gave him the phone # to [facility].

## 2019-08-29 NOTE — ASSESSMENT & PLAN NOTE
Creatinine- 2.2, baseline 1.5; Believe from heat exposure and dehydration    IVF  Avoid nephrotoxics  Consult Nephrology if does not correct overnight

## 2019-08-29 NOTE — ASSESSMENT & PLAN NOTE
Baseline Cr around 1.5  Cr up to 2.2 due to dehydration  Resolved and back to baseline after IVF  Stable for transfer to psych for further treatment

## 2019-08-29 NOTE — PLAN OF CARE
Nestor from [facility] reports they will be sending a secured psych car to transport patient - ANGEL whiteside
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
## 2019-08-29 NOTE — PROGRESS NOTES
10:03am - SW visited pt, sitter/PCT present; pt asleep and wouldn't wake up.    11:25am - SW called pt's daughter, Daysi [phone] and [phone], left message at both #s and called Kelly, relative, [phone], left message.    12:02pm - Pt PEC; SW called [facility] [place] [phone], spoke to Yao, has a male bed and SW faxed PEC and medical records to [phone].    12:04pm - SW called [facility], [phone], spoke to [facility], has a bed and SW faxed PEC and meds to [phone].      Pt will be placed with first accepting provider.    12:19pm - DIANNE received call from Yao at [facility], accepted pt.  Pt will go to 3rd floor, admitting is Dr. Grossman, call report to [phone], ext. [phone].    SW notified MD of acceptance and pt's nurse.

## 2020-03-02 ENCOUNTER — PATIENT OUTREACH (OUTPATIENT)
Dept: ADMINISTRATIVE | Facility: HOSPITAL | Age: 70
End: 2020-03-02

## 2021-07-02 NOTE — H&P
"[facility] Medicine  History & Physical    Patient Name: Oral Gonzales  MRN: [id number]  Admission Date: 12/13/2018  Attending Physician: Birdie Vinson MD   Primary Care Provider: Dickson Laurent MD         Patient information was obtained from patient, past medical records and ER records.     Subjective:     Principal Problem:SANJAY (acute kidney injury)    Chief Complaint:   Chief Complaint   Patient presents with    OPC     Pt brought in by [facility]. OPC states pt refuses  psych meds and is delusional and psychotic. PMH of schizophrenia. Pt refuses to answer any questions or cooperate in triage. Will not tell me his name.         HPI: Mr. Oral Gonzales is a 68 y.o. male, with PMH of schizophrenia, who was brought in by [facility] as he was found inadequately clothed sitting in front of his home. Per ED note, he "refuses his psych medications. Patient states he is "not at liberty to say" when he asked questions during ED visit. Patient's relative reports last hospitalization was 4 months ago and lasted for about seven days at . Patient's relative reports swelling in the lower extremities. Patient's relative reports patient lives on his own, but is checked on daily. Patient's relative reports he has not been compliant with medications and is not cooperative." He was evaluated in the ED, and labs showed SANJAY. He was admitted to inpatient status for SANJAY workup and Psych clearance.           Past Medical History:   Diagnosis Date    Arthritis     Edema     Hyperlipidemia     Schizophrenia, paranoid type     Venous insufficiency        Past Surgical History:   Procedure Laterality Date    colonoscopy N/A 12/29/2014    Performed by Nish Batista MD at [facility] (4TH FLR)    no sx         Review of patient's allergies indicates:  No Known Allergies    No current facility-administered medications on file prior to encounter.      No current outpatient medications on file prior to " encounter.     Family History     Problem Relation (Age of Onset)    Blindness Mother    Cataracts Father    Deep vein thrombosis Sister    Diabetes Father    Edema Mother    Glaucoma Mother    Heart disease Mother, Father    No Known Problems Brother, Maternal Aunt, Maternal Uncle, Paternal Aunt, Paternal Uncle, Maternal Grandmother, Maternal Grandfather, Paternal Grandmother, Paternal Grandfather        Tobacco Use    Smoking status: Never Smoker    Smokeless tobacco: Never Used   Substance and Sexual Activity    Alcohol use: No    Drug use: No    Sexual activity: Not on file     Review of Systems   Unable to perform ROS: Psychiatric disorder     Objective:     Vital Signs (Most Recent):  Temp: 97 °F (36.1 °C) (12/14/18 0007)  Pulse: 70 (12/14/18 0400)  Resp: 18 (12/14/18 0007)  BP: 134/73 (12/14/18 0007)  SpO2: 99 % (12/14/18 0007) Vital Signs (24h Range):  Temp:  [97 °F (36.1 °C)] 97 °F (36.1 °C)  Pulse:  [] 70  Resp:  [15-20] 18  SpO2:  [95 %-100 %] 99 %  BP: (115-179)/(58-83) 134/73     Weight: 93 kg (205 lb)  Body mass index is 31.17 kg/m².    Physical Exam   Constitutional: Vital signs are normal. He appears well-developed and well-nourished.  Non-toxic appearance. He does not have a sickly appearance. He does not appear ill. No distress.   HENT:   Head: Normocephalic and atraumatic.   Right Ear: External ear normal.   Left Ear: External ear normal.   Eyes: Conjunctivae and EOM are normal. Pupils are equal, round, and reactive to light. No scleral icterus.   Neck: Normal range of motion. Neck supple. No JVD present. No tracheal deviation present.   Cardiovascular: Normal rate, regular rhythm, normal heart sounds and intact distal pulses. Exam reveals no gallop and no friction rub.   No murmur heard.  Pulmonary/Chest: Effort normal and breath sounds normal. No stridor. No respiratory distress. He has no wheezes. He has no rales.   Abdominal: Soft. Bowel sounds are normal. He exhibits no distension  and no mass. There is no tenderness. There is no guarding.   Musculoskeletal: He exhibits no edema.   Neurological: He is alert. Coordination normal.   Skin: Skin is warm and dry. He is not diaphoretic.   Psychiatric: He has a normal mood and affect. His behavior is normal. Judgment and thought content normal.   Nursing note and vitals reviewed.        CRANIAL NERVES     CN III, IV, VI   Pupils are equal, round, and reactive to light.  Extraocular motions are normal.        Significant Labs:   BMP:   Recent Labs   Lab 12/14/18  0217   GLU 90      K 4.0      CO2 20*   BUN 27*   CREATININE 1.5*   CALCIUM 9.1     CBC:   Recent Labs   Lab 12/13/18 1858   WBC 8.50   HGB 15.3   HCT 46.0        CMP:   Recent Labs   Lab 12/13/18 1858 12/13/18 2218 12/14/18 0217    138 139   K 6.1* 6.4* 4.0    107 110   CO2 22* 18* 20*   * 111* 90   BUN 32* 31* 27*   CREATININE 1.9* 1.7* 1.5*   CALCIUM 10.2 9.4 9.1   PROT 8.5*  --  6.7   ALBUMIN 4.6  --  3.6   BILITOT 0.7  --  0.7   ALKPHOS 87  --  68   AST 50*  --  45*   ALT 39  --  28   ANIONGAP 13 13 9   EGFRNONAA 35* 41* 47*     TSH:   Recent Labs   Lab 12/13/18 1858   TSH 1.987     Urine Culture: No results for input(s): LABURIN in the last 48 hours.  Urine Studies:   Recent Labs   Lab 12/13/18 2103   COLORU Yellow   APPEARANCEUA Clear   PHUR 5.0   SPECGRAV >=1.030*   PROTEINUA 2+*   GLUCUA Negative   KETONESU Trace*   BILIRUBINUA Negative   OCCULTUA 2+*   NITRITE Negative   UROBILINOGEN Negative   LEUKOCYTESUR Negative   RBCUA 6*   WBCUA 2   BACTERIA Occasional   HYALINECASTS 1     All pertinent labs within the past 24 hours have been reviewed.    Significant Imaging: I have reviewed all pertinent imaging results/findings within the past 24 hours.   Imaging Results          US Retroperitoneal Complete (Final result)  Result time 12/13/18 23:57:00   Procedure changed from US Abdomen Pelvis Doppler Study Limited (retroperitoneal)     Final  "result by Lauren Porter MD (12/13/18 23:57:00)                 Impression:      No evidence of hydronephrosis or other acute abnormality.      Electronically signed by: Lauren Porter MD  Date:    12/13/2018  Time:    23:57             Narrative:    EXAMINATION:  US RETROPERITONEAL COMPLETE    CLINICAL HISTORY:  SANJAY; Acute kidney failure, unspecified    TECHNIQUE:  Ultrasound of the kidneys and urinary bladder was performed including color flow and Doppler evaluation of the kidneys.    Please note that imaging examination is slightly limited due to patient's inability to tolerate the exam and comply with breath hold instructions.    COMPARISON:  None.    FINDINGS:  Right kidney: The right kidney measures 9.4 x 4.4 x 4 pronounced cm.  There is no loss of corticomedullary distinction or increased renal echogenicity.  Resistive index measures 0.63.  No renal stone. No hydronephrosis.    Left kidney: The left kidney measures 11.0 x 5.9 x 5.3 cm. There is slightly limited evaluation of the left kidney due to technical factors detailed above.  Resistive index measures 0.79.  No renal stone. No hydronephrosis.    The bladder is partially distended at the time of scanning and has an unremarkable appearance.                               X-Ray Chest AP Portable (Final result)  Result time 12/13/18 22:55:59    Final result by Rome Stephenson MD (12/13/18 22:55:59)                 Impression:      Low lung volumes and bibasilar subsegmental atelectasis, but no definite acute finding identified on this limited single view.  Upright, inspiratory PA and lateral views could be helpful if there is persistent clinical concern.      Electronically signed by: Rome Stephenson MD  Date:    12/13/2018  Time:    22:55             Narrative:    EXAMINATION:  XR CHEST AP PORTABLE    CLINICAL HISTORY:  Provided history is "  Edema, unspecified".    TECHNIQUE:  One view of the chest.    COMPARISON:  11/25/2017.    FINDINGS:  Cardiac wires " overlie the chest.  Lung volumes are low.  Bibasilar subsegmental atelectasis.  No focal consolidation.  No sizable pleural effusion.  No pneumothorax.                                 Assessment/Plan:     * SANJAY (acute kidney injury)    - Mr. Oral Gonzales is admitted to in patient status   - appears to have SANJAY on unknown baseline  - Per his renal function is reduced, and he denies taking ibuprofen daily  - retroperitoneal US without acute findings   - SANJAY studies pending  - monitor       Hyperkalemia    - s/p hyperkalemia cocktail values are WNL  - cause unclear   - monitor     Schizophrenia    - has not been taking medications per EMS  - refuses to answer questions   - states no SI/HI  - s/p B52 upon arrival to unit  - PEC'd  - telepsych consult ordered   - monitor       Non compliance w medication regimen    - patient seems to have a poor fund of knowledge regarding importance of medication compliance   - patient has not been taking psych meds at home reportedly        Hyperlipidemia    - continue home meds         VTE Risk Mitigation (From admission, onward)        Ordered     heparin (porcine) injection 5,000 Units  Every 8 hours      12/14/18 0152     IP VTE HIGH RISK PATIENT  Once      12/14/18 0152     Place sequential compression device  Until discontinued      12/14/18 0034             Lesly Slaughter PA-C  Department of Hospital Medicine   [facility]   right ankle/foot times three days

## 2023-04-25 ENCOUNTER — HOSPITAL ENCOUNTER (EMERGENCY)
Facility: HOSPITAL | Age: 73
Discharge: HOME | End: 2023-04-25
Attending: EMERGENCY MEDICINE
Payer: MEDICARE

## 2023-04-25 VITALS
BODY MASS INDEX: 31.22 KG/M2 | HEIGHT: 68 IN | RESPIRATION RATE: 16 BRPM | SYSTOLIC BLOOD PRESSURE: 150 MMHG | DIASTOLIC BLOOD PRESSURE: 77 MMHG | TEMPERATURE: 98 F | OXYGEN SATURATION: 99 % | WEIGHT: 206 LBS | HEART RATE: 60 BPM

## 2023-04-25 DIAGNOSIS — R21 RASH AND NONSPECIFIC SKIN ERUPTION: Primary | ICD-10-CM

## 2023-04-25 DIAGNOSIS — R73.9 HYPERGLYCEMIA, UNSPECIFIED: ICD-10-CM

## 2023-04-25 DIAGNOSIS — I87.2 VENOUS STASIS DERMATITIS OF BOTH LOWER EXTREMITIES: ICD-10-CM

## 2023-04-25 DIAGNOSIS — R60.0 EDEMA OF BOTH LOWER EXTREMITIES: ICD-10-CM

## 2023-04-25 LAB
ALBUMIN SERPL BCP-MCNC: 3.8 G/DL (ref 3.5–5.2)
ALP SERPL-CCNC: 92 U/L (ref 55–135)
ALT SERPL W/O P-5'-P-CCNC: 19 U/L (ref 10–44)
ANION GAP SERPL CALC-SCNC: 10 MMOL/L (ref 8–16)
AST SERPL-CCNC: 23 U/L (ref 10–40)
BASOPHILS # BLD AUTO: 0.03 K/UL (ref 0–0.2)
BASOPHILS NFR BLD: 0.4 % (ref 0–1.9)
BILIRUB SERPL-MCNC: 0.3 MG/DL (ref 0.1–1)
BUN SERPL-MCNC: 14 MG/DL (ref 8–23)
BUN SERPL-MCNC: 18 MG/DL (ref 6–30)
CALCIUM SERPL-MCNC: 9.7 MG/DL (ref 8.7–10.5)
CHLORIDE SERPL-SCNC: 108 MMOL/L (ref 95–110)
CHLORIDE SERPL-SCNC: 109 MMOL/L (ref 95–110)
CO2 SERPL-SCNC: 22 MMOL/L (ref 23–29)
CREAT SERPL-MCNC: 1.2 MG/DL (ref 0.5–1.4)
CREAT SERPL-MCNC: 1.3 MG/DL (ref 0.5–1.4)
DIFFERENTIAL METHOD: ABNORMAL
EOSINOPHIL # BLD AUTO: 0.2 K/UL (ref 0–0.5)
EOSINOPHIL NFR BLD: 3.3 % (ref 0–8)
ERYTHROCYTE [DISTWIDTH] IN BLOOD BY AUTOMATED COUNT: 13 % (ref 11.5–14.5)
EST. GFR  (NO RACE VARIABLE): >60 ML/MIN/1.73 M^2
GLUCOSE SERPL-MCNC: 107 MG/DL (ref 70–110)
GLUCOSE SERPL-MCNC: 113 MG/DL (ref 70–110)
HCT VFR BLD AUTO: 47.4 % (ref 40–54)
HCT VFR BLD CALC: 47 %PCV (ref 36–54)
HGB BLD-MCNC: 15.3 G/DL (ref 14–18)
IMM GRANULOCYTES # BLD AUTO: 0.01 K/UL (ref 0–0.04)
IMM GRANULOCYTES NFR BLD AUTO: 0.1 % (ref 0–0.5)
LYMPHOCYTES # BLD AUTO: 1.3 K/UL (ref 1–4.8)
LYMPHOCYTES NFR BLD: 17.1 % (ref 18–48)
MCH RBC QN AUTO: 30.3 PG (ref 27–31)
MCHC RBC AUTO-ENTMCNC: 32.3 G/DL (ref 32–36)
MCV RBC AUTO: 94 FL (ref 82–98)
MONOCYTES # BLD AUTO: 0.7 K/UL (ref 0.3–1)
MONOCYTES NFR BLD: 9.3 % (ref 4–15)
NEUTROPHILS # BLD AUTO: 5.1 K/UL (ref 1.8–7.7)
NEUTROPHILS NFR BLD: 69.8 % (ref 38–73)
NRBC BLD-RTO: 0 /100 WBC
PLATELET # BLD AUTO: 170 K/UL (ref 150–450)
PMV BLD AUTO: 9.3 FL (ref 9.2–12.9)
POC IONIZED CALCIUM: 1.07 MMOL/L (ref 1.06–1.42)
POC TCO2 (MEASURED): 26 MMOL/L (ref 23–29)
POTASSIUM BLD-SCNC: 4.8 MMOL/L (ref 3.5–5.1)
POTASSIUM SERPL-SCNC: 5 MMOL/L (ref 3.5–5.1)
PROT SERPL-MCNC: 7.1 G/DL (ref 6–8.4)
RBC # BLD AUTO: 5.05 M/UL (ref 4.6–6.2)
SAMPLE: ABNORMAL
SODIUM BLD-SCNC: 139 MMOL/L (ref 136–145)
SODIUM SERPL-SCNC: 141 MMOL/L (ref 136–145)
WBC # BLD AUTO: 7.32 K/UL (ref 3.9–12.7)

## 2023-04-25 PROCEDURE — 85025 COMPLETE CBC W/AUTO DIFF WBC: CPT | Performed by: EMERGENCY MEDICINE

## 2023-04-25 PROCEDURE — 99284 EMERGENCY DEPT VISIT MOD MDM: CPT | Mod: ,,, | Performed by: EMERGENCY MEDICINE

## 2023-04-25 PROCEDURE — 63600175 PHARM REV CODE 636 W HCPCS: Performed by: EMERGENCY MEDICINE

## 2023-04-25 PROCEDURE — 99284 PR EMERGENCY DEPT VISIT,LEVEL IV: ICD-10-PCS | Mod: ,,, | Performed by: EMERGENCY MEDICINE

## 2023-04-25 PROCEDURE — 80047 BASIC METABLC PNL IONIZED CA: CPT

## 2023-04-25 PROCEDURE — 96374 THER/PROPH/DIAG INJ IV PUSH: CPT

## 2023-04-25 PROCEDURE — 80053 COMPREHEN METABOLIC PANEL: CPT | Performed by: EMERGENCY MEDICINE

## 2023-04-25 PROCEDURE — 25000003 PHARM REV CODE 250: Performed by: EMERGENCY MEDICINE

## 2023-04-25 PROCEDURE — 82330 ASSAY OF CALCIUM: CPT

## 2023-04-25 PROCEDURE — 99284 EMERGENCY DEPT VISIT MOD MDM: CPT | Mod: 25

## 2023-04-25 RX ORDER — FUROSEMIDE 40 MG/1
40 TABLET ORAL
Status: COMPLETED | OUTPATIENT
Start: 2023-04-25 | End: 2023-04-25

## 2023-04-25 RX ORDER — FUROSEMIDE 20 MG/1
20 TABLET ORAL DAILY
Qty: 90 TABLET | Refills: 0 | OUTPATIENT
Start: 2023-04-25 | End: 2023-05-20

## 2023-04-25 RX ORDER — DEXAMETHASONE SODIUM PHOSPHATE 4 MG/ML
8 INJECTION, SOLUTION INTRA-ARTICULAR; INTRALESIONAL; INTRAMUSCULAR; INTRAVENOUS; SOFT TISSUE
Status: COMPLETED | OUTPATIENT
Start: 2023-04-25 | End: 2023-04-25

## 2023-04-25 RX ADMIN — DEXAMETHASONE SODIUM PHOSPHATE 8 MG: 4 INJECTION INTRA-ARTICULAR; INTRALESIONAL; INTRAMUSCULAR; INTRAVENOUS; SOFT TISSUE at 01:04

## 2023-04-25 RX ADMIN — FUROSEMIDE 40 MG: 40 TABLET ORAL at 02:04

## 2023-04-25 NOTE — ED NOTES
Pt with bilateral leg pain/numbness x2 weeks. Bilateral thigh rash with itching x 2 days. 3+ edema to legs and feet bilaterally.

## 2023-04-25 NOTE — ED NOTES
Patient identifiers for Oral Gonzales 72 y.o. male checked and correct.  Chief Complaint   Patient presents with    Rash     Hx of chronic venous insufficiency. Petechiae and rash noted to anterior portion of thighs bilaterally - onset 2 days ago. +pruritus and TTP. Pt ambulatory.      Past Medical History:   Diagnosis Date    Arthritis     CKD (chronic kidney disease), stage III     Edema     Hyperlipidemia     Schizophrenia, paranoid type     Venous insufficiency      Allergies reported: Review of patient's allergies indicates:  No Known Allergies      LOC: Patient is awake, alert, and aware of environment with an appropriate affect. Patient is oriented x 4 and speaking appropriately.  APPEARANCE: Patient resting comfortably and in no acute distress. Patient is clean and well groomed, patient's clothing is properly fastened.  HEENT: WDL  SKIN: The skin is warm and dry. Ecchymosis-like rash to BLE, chronic venous insuffiencey    MUSKULOSKELETAL: Patient is moving all extremities well, no obvious deformities noted. Pulses intact.   RESPIRATORY: Airway is open and patent. Respirations are spontaneous and non-labored with normal effort and rate.  CARDIAC: Patient has a normal rate and rhythm. 85 on cardiac monitor. No peripheral edema noted.   ABDOMEN: No distention noted. Soft and non-tender upon palpation.  NEUROLOGICAL: pupils 3mm, PERRL. Facial expression is symmetrical. Hand grasps are equal bilaterally. Normal sensation in all extremities when touched with finger.

## 2023-04-25 NOTE — ED PROVIDER NOTES
Encounter Date: 4/25/2023    SCRIBE #1 NOTE: I, Nupur Herman, am scribing for, and in the presence of,  Vinicius Cuba III, MD. I have scribed the following portions of the note - Other sections scribed: HPI, ROS, PE.     History     Chief Complaint   Patient presents with    Rash     Hx of chronic venous insufficiency. Petechiae and rash noted to anterior portion of thighs bilaterally - onset 2 days ago. +pruritus and TTP. Pt ambulatory.      Time patient was seen by the provider: 12:22 PM      The patient is a 72 y.o. male with past medical history of chronic venous insufficiency, HLD, stage III CKD, and schizophrenia who presents to the ED with a complaint of rash onset last week. The patient's rash is located on the anterior aspect of his thigh bilaterally. He has been cleaning the area and applying baby oil with no improvement. Associated symptoms include itchiness, burning, numbness, and tingling sensations. He reports decreased sensation to the medial aspect of both ankles and heels that started last week. The patient endorses discoloration and flaky skin to anterior tibia region bilaterally. His dry skin is chronic and unchanged from baseline. He did not take any medications PTA. The patient lives alone, but notes that he have a sister who lives in town. His appetite is unchanged. He denies fever, chills, SOB, chest pain, difficulty urinating, and bowel changes.     The history is provided by the patient and medical records. No  was used.     Review of patient's allergies indicates:  No Known Allergies  Past Medical History:   Diagnosis Date    Arthritis     CKD (chronic kidney disease), stage III     Edema     Hyperlipidemia     Schizophrenia, paranoid type     Venous insufficiency      Past Surgical History:   Procedure Laterality Date    no sx       Family History   Problem Relation Age of Onset    Blindness Mother     Glaucoma Mother     Heart disease Mother     Edema Mother      Cataracts Father     Heart disease Father     Diabetes Father     Deep vein thrombosis Sister     No Known Problems Brother     No Known Problems Maternal Aunt     No Known Problems Maternal Uncle     No Known Problems Paternal Aunt     No Known Problems Paternal Uncle     No Known Problems Maternal Grandmother     No Known Problems Maternal Grandfather     No Known Problems Paternal Grandmother     No Known Problems Paternal Grandfather     Amblyopia Neg Hx     Macular degeneration Neg Hx     Retinal detachment Neg Hx     Strabismus Neg Hx     Cancer Neg Hx     Hypertension Neg Hx     Stroke Neg Hx     Thyroid disease Neg Hx      Social History     Tobacco Use    Smoking status: Never    Smokeless tobacco: Never   Substance Use Topics    Alcohol use: No    Drug use: No     Review of Systems   Constitutional:  Negative for appetite change, chills and fever.   HENT:  Negative for sore throat.    Respiratory:  Negative for shortness of breath.    Cardiovascular:  Negative for chest pain.   Gastrointestinal:  Negative for constipation, diarrhea and nausea.   Genitourinary:  Negative for difficulty urinating and dysuria.   Musculoskeletal:  Negative for back pain.   Skin:  Positive for color change (anterior tibia region) and rash (anterior thigh).   Neurological:  Positive for numbness. Negative for weakness.        +Diminished sensation to medial aspect of both ankles and heels. +Paresthesia   Hematological:  Does not bruise/bleed easily.     Physical Exam     Initial Vitals [04/25/23 1044]   BP Pulse Resp Temp SpO2   131/73 86 19 98 °F (36.7 °C) 99 %      MAP       --         Physical Exam    Nursing note and vitals reviewed.    Appearance: No acute distress.  Skin: Good turgor.  No abrasions. Cap refill <2 seconds. Normal temperature. Dry purplish discoloration with flaky skin to bilateral tibia region anteriorly. No cellulitic changes, purulence, or exudate.   Anterior thigh: Bilateral skin eruption that is  pustular, pruritus, and non-tender.   Eyes: No conjunctival injection.  ENT: Oropharynx clear.    Chest: Clear to auscultation bilaterally.  Good air movement.  No wheezes.  No rhonchi.  Cardiovascular: Regular rate and rhythm. No murmurs. No gallops. No rubs. Pulses difficult to palpate bilaterally over foot and ankle due to swelling.  Abdomen: Soft.  Not distended.  Nontender.  No guarding.  No rebound.  Musculoskeletal: Pre-tibial edema with 3+ non-pitting edema over dorsum of both feet. Good range of motion all joints.  No deformities.  Neck supple.  No meningismus.  Neurologic: Diminished sensation to light touch along medial aspect of both ankles and heels. Motor intact. Cerebellar intact.  Cranial nerves intact.  Mental Status:  Alert and oriented x 3.  Appropriate, conversant.      ED Course   Procedures  Labs Reviewed   CBC W/ AUTO DIFFERENTIAL - Abnormal; Notable for the following components:       Result Value    Lymph % 17.1 (*)     All other components within normal limits   COMPREHENSIVE METABOLIC PANEL - Abnormal; Notable for the following components:    CO2 22 (*)     All other components within normal limits   ISTAT PROCEDURE - Abnormal; Notable for the following components:    POC Glucose 113 (*)     All other components within normal limits          Imaging Results    None          Medications   dexAMETHasone injection 8 mg (8 mg Intravenous Given 4/25/23 1326)   furosemide tablet 40 mg (40 mg Oral Given 4/25/23 1457)     Medical Decision Making:   History:   Old Medical Records: I decided to obtain old medical records.  Clinical Tests:   Lab Tests: Ordered and Reviewed  ED Management:  Pt presents with chronic edema that is worsened and new skin eruption to ant thigh that is pruritic and contained to the same area.  He denies any new meds, clothes, lotions, soaps, etc.  He denies any CP/SOB.  He denies any difficulties with urination or BM's.  He also has dry flaky skin to the bilat ant tibial  area with dark purplish discoloration in the same area.  There are no lesions or cellulitic signs.  Pt has h/o SANJAY which was checked and not present today.  Was also concerned pruritis may be generated from bilirubin, and his LFT's were normal.  CBC also normal for any signs of infection, thrombocytopenia, or anemia.  Pt given Lasix in the ED and referred to primary care for management of all above.  He as given an rx for Lasix 20mg/day.  He verbalized his understanding & agreement w/ the dx and plan.        Scribe Attestation:   Scribe #1: I performed the above scribed service and the documentation accurately describes the services I performed. I attest to the accuracy of the note.                   Clinical Impression:   Final diagnoses:  [R21] Rash and nonspecific skin eruption (Primary)  [R60.0] Edema of both lower extremities  [R73.9] Hyperglycemia, unspecified  [I87.2] Venous stasis dermatitis of both lower extremities        ED Disposition Condition    Discharge Stable          ED Prescriptions       Medication Sig Dispense Start Date End Date Auth. Provider    furosemide (LASIX) 20 MG tablet Take 1 tablet (20 mg total) by mouth once daily. 90 tablet 4/25/2023 7/24/2023 Vinicius Cuba III, MD          Follow-up Information    None          Vinicius Cuba III, MD  04/26/23 1510

## 2023-05-20 ENCOUNTER — HOSPITAL ENCOUNTER (EMERGENCY)
Facility: OTHER | Age: 73
Discharge: HOME | End: 2023-05-20
Attending: EMERGENCY MEDICINE
Payer: MEDICARE

## 2023-05-20 VITALS
BODY MASS INDEX: 33.74 KG/M2 | WEIGHT: 215 LBS | HEART RATE: 60 BPM | TEMPERATURE: 99 F | OXYGEN SATURATION: 99 % | HEIGHT: 67 IN | RESPIRATION RATE: 16 BRPM | SYSTOLIC BLOOD PRESSURE: 142 MMHG | DIASTOLIC BLOOD PRESSURE: 65 MMHG

## 2023-05-20 DIAGNOSIS — R60.0 BILATERAL LOWER EXTREMITY EDEMA: Primary | ICD-10-CM

## 2023-05-20 DIAGNOSIS — L30.9 DERMATITIS: ICD-10-CM

## 2023-05-20 PROCEDURE — 25000003 PHARM REV CODE 250: Performed by: EMERGENCY MEDICINE

## 2023-05-20 PROCEDURE — 99284 EMERGENCY DEPT VISIT MOD MDM: CPT

## 2023-05-20 RX ORDER — FUROSEMIDE 40 MG/1
40 TABLET ORAL DAILY
Qty: 30 TABLET | Refills: 0 | Status: SHIPPED | OUTPATIENT
Start: 2023-05-20 | End: 2023-05-31

## 2023-05-20 RX ORDER — FUROSEMIDE 20 MG/1
40 TABLET ORAL
Status: COMPLETED | OUTPATIENT
Start: 2023-05-20 | End: 2023-05-20

## 2023-05-20 RX ORDER — POTASSIUM CHLORIDE 20 MEQ/1
20 TABLET, EXTENDED RELEASE ORAL DAILY
Qty: 30 TABLET | Refills: 0 | Status: SHIPPED | OUTPATIENT
Start: 2023-05-20 | End: 2023-05-31

## 2023-05-20 RX ORDER — HYDROXYZINE HYDROCHLORIDE 25 MG/1
25 TABLET, FILM COATED ORAL 4 TIMES DAILY PRN
Qty: 30 TABLET | Refills: 0 | Status: SHIPPED | OUTPATIENT
Start: 2023-05-20 | End: 2023-05-31

## 2023-05-20 RX ADMIN — FUROSEMIDE 40 MG: 20 TABLET ORAL at 08:05

## 2023-05-20 NOTE — ED TRIAGE NOTES
72 YOM presents to ED with c/o BLE swelling, dry, malodor x 2 wks. +2 pitting edema noted to BLE no drainage noted. PMH Venous insufficiency. Placed on Lasix 04/25 with no improvement. A&Ox4. Denies any other complaints.     LOC: The patient is awake, alert and aware of environment with an appropriate affect, the patient is oriented x 3.  APPEARANCE: Patient resting comfortably and in no acute distress, patient is clean and well groomed, patient's clothing is properly fastened.  SKIN: The skin is warm and dry, skin intact, no breakdown or brusing noted.  MUSKULOSKELETAL: Patient moving all extremities well, no deformities noted.  RESPIRATORY: Airway is open and patent, respirations are spontaneous, patient has a normal effort and rate.  CARDIAC: BLE edema noted.  ABDOMEN: Soft and no tenderness to palpation, no distention noted.     ED workup in progress. VSS. Safety measures in place; side rails up x2. Call light within pt reach. Will continue to monitor.

## 2023-05-20 NOTE — ED PROVIDER NOTES
"     Source of History:  Patient    Chief complaint:  Leg Swelling (C/o BLE swelling, dry, malodor x 2 wks. +2 pitting edema noted to BLE no drainage noted. PMH Venous insufficiency. Placed on Lasix 04/25 with no improvement. VSS)      HPI:  Oral Gonzales is a 72 y.o. male presenting with leg swelling, itching and rash.  Initially reported it has been present for the past 2 weeks but upon questioning further it has been much more chronic than that.  Denies chest pain shortness of breath.  States he saw another hospital and they gave him a pill there which worked well but the prescription they gave him did not.  No fevers.  Complained of legs being malodorous but denies any unusual drainage.    This is the extent to the patients complaints today here in the emergency department.    ROS: As per HPI and below:  General: No fever.  No chills.  Eyes: No visual changes.   ENT: No sore throat. No ear pain.  Urinary: No abnormal urination.  MSK: No back pain. No joint pain.         Review of patient's allergies indicates:  No Known Allergies    PMH:  As per HPI and below:  Past Medical History:   Diagnosis Date    Arthritis     CKD (chronic kidney disease), stage III     Edema     Hyperlipidemia     Schizophrenia, paranoid type     Venous insufficiency      Past Surgical History:   Procedure Laterality Date    no sx         Social History     Tobacco Use    Smoking status: Never    Smokeless tobacco: Never   Substance Use Topics    Alcohol use: No    Drug use: No       Physical Exam:    /75   Pulse 76   Temp 98.2 °F (36.8 °C) (Oral)   Resp 18   Ht 5' 7" (1.702 m)   Wt 97.5 kg (215 lb)   SpO2 98%   BMI 33.67 kg/m²   Nursing note and vital signs reviewed.  Appearance: No acute distress.  Eyes: No conjunctival injection.  ENT: Normal phonation.  Cardiac: Regular rate and rhythm, normal S1 and S2.    Respiratory: Clear to auscultation bilaterally.  No wheezes rales.  Musculoskeletal: Good range of motion all " joints.  No deformities.  Neck supple.  No meningismus.  No JVD Neurovascularly intact.  Skin:  Chronic appearing edema, 3+, to the proximal tibial area.  Hair loss, thickening and cracking of skin, diffuse erythema without cellulitic border.  Patient also has dry scaly rash on bilateral anterior thighs with excoriations.  There is no foul odor.  No drainage.  Mental Status:  Alert and oriented x 3.  Appropriate, conversant.    Labs that have been ordered have been independently reviewed and interpreted by myself.    I decided to obtain the patient's medical records.  Summary of Medical Records:  Recent evaluation at urgent care with normal labs, placed on 20 mg of Lasix.    MDM/ Differential Dx:    72 y.o. male with chronic edema of the lower extremities.  Patient complaining of itching.  Will give trial of Atarax.  Will increase Lasix to 40 mg q.day along with potassium replacement.  He has not seen his primary care physician in many years.  Encouraged him to schedule an appointment especially given the ongoing problems with distal edema.  I have sent an ambulatory referral to Dermatology and also suggested that patient may be able to schedule at [facility].  Patient was requesting admission so we could take care of the swelling however explained to patient this is a chronic condition will take quite some time to improve, admission is not warranted at this time.  Encouraged elevation of legs when at rest and follow-up with the primary care physician in Dermatology.                 Diagnostic Impression:    1. Bilateral lower extremity edema    2. Dermatitis         ED Disposition Condition    Discharge Stable            ED Prescriptions       Medication Sig Dispense Start Date End Date Auth. Provider    furosemide (LASIX) 40 MG tablet Take 1 tablet (40 mg total) by mouth once daily. 30 tablet 5/20/2023 5/19/2024 Rock Atkinson II, MD    hydrOXYzine HCL (ATARAX) 25 MG tablet Take 1 tablet (25 mg total)  by mouth 4 (four) times daily as needed for Itching. 30 tablet 5/20/2023 -- Rock Atkinson II, MD    potassium chloride SA (K-DUR,KLOR-CON) 20 MEQ tablet Take 1 tablet (20 mEq total) by mouth once daily. 30 tablet 5/20/2023 -- Rock Atkinson II, MD          Follow-up Information       Follow up With Specialties Details Why Contact Info    Dickson Laurent MD Internal Medicine In 5 days  [address]  [place] [address]  [phone]      [facility] Pediatric Dermatology, Dermatology Schedule an appointment as soon as possible for a visit   [address] [facility]  [address]  [facility] [address] [phone]               Rock Atkinson II, MD  05/20/23 8584

## 2023-05-22 ENCOUNTER — HOSPITAL ENCOUNTER (EMERGENCY)
Facility: HOSPITAL | Age: 73
Discharge: HOME | End: 2023-05-22
Attending: EMERGENCY MEDICINE
Payer: MEDICARE

## 2023-05-22 VITALS
DIASTOLIC BLOOD PRESSURE: 78 MMHG | HEART RATE: 87 BPM | WEIGHT: 215 LBS | HEIGHT: 67 IN | TEMPERATURE: 98 F | SYSTOLIC BLOOD PRESSURE: 160 MMHG | BODY MASS INDEX: 33.74 KG/M2 | OXYGEN SATURATION: 100 % | RESPIRATION RATE: 15 BRPM

## 2023-05-22 DIAGNOSIS — I87.2 VENOUS STASIS DERMATITIS OF BOTH LOWER EXTREMITIES: ICD-10-CM

## 2023-05-22 DIAGNOSIS — Z51.89 VISIT FOR WOUND CHECK: Primary | ICD-10-CM

## 2023-05-22 LAB
BASOPHILS # BLD AUTO: 0.02 K/UL (ref 0–0.2)
BASOPHILS NFR BLD: 0.3 % (ref 0–1.9)
BNP SERPL-MCNC: 27 PG/ML (ref 0–99)
BUN SERPL-MCNC: 17 MG/DL (ref 6–30)
CHLORIDE SERPL-SCNC: 106 MMOL/L (ref 95–110)
CREAT SERPL-MCNC: 1.4 MG/DL (ref 0.5–1.4)
DIFFERENTIAL METHOD: ABNORMAL
EOSINOPHIL # BLD AUTO: 0.6 K/UL (ref 0–0.5)
EOSINOPHIL NFR BLD: 10.4 % (ref 0–8)
ERYTHROCYTE [DISTWIDTH] IN BLOOD BY AUTOMATED COUNT: 14 % (ref 11.5–14.5)
GLUCOSE SERPL-MCNC: 99 MG/DL (ref 70–110)
HCT VFR BLD AUTO: 49.6 % (ref 40–54)
HCT VFR BLD CALC: 53 %PCV (ref 36–54)
HCV AB SERPL QL IA: NORMAL
HGB BLD-MCNC: 16 G/DL (ref 14–18)
HIV 1+2 AB+HIV1 P24 AG SERPL QL IA: NORMAL
IMM GRANULOCYTES # BLD AUTO: 0.01 K/UL (ref 0–0.04)
IMM GRANULOCYTES NFR BLD AUTO: 0.2 % (ref 0–0.5)
LYMPHOCYTES # BLD AUTO: 0.8 K/UL (ref 1–4.8)
LYMPHOCYTES NFR BLD: 13.8 % (ref 18–48)
MCH RBC QN AUTO: 31.2 PG (ref 27–31)
MCHC RBC AUTO-ENTMCNC: 32.3 G/DL (ref 32–36)
MCV RBC AUTO: 97 FL (ref 82–98)
MONOCYTES # BLD AUTO: 0.6 K/UL (ref 0.3–1)
MONOCYTES NFR BLD: 11.1 % (ref 4–15)
NEUTROPHILS # BLD AUTO: 3.7 K/UL (ref 1.8–7.7)
NEUTROPHILS NFR BLD: 64.2 % (ref 38–73)
NRBC BLD-RTO: 0 /100 WBC
PLATELET # BLD AUTO: 163 K/UL (ref 150–450)
PMV BLD AUTO: 12.5 FL (ref 9.2–12.9)
POC IONIZED CALCIUM: 0.96 MMOL/L (ref 1.06–1.42)
POC TCO2 (MEASURED): 20 MMOL/L (ref 23–29)
POTASSIUM BLD-SCNC: 4.4 MMOL/L (ref 3.5–5.1)
RBC # BLD AUTO: 5.13 M/UL (ref 4.6–6.2)
SAMPLE: ABNORMAL
SODIUM BLD-SCNC: 138 MMOL/L (ref 136–145)
WBC # BLD AUTO: 5.79 K/UL (ref 3.9–12.7)

## 2023-05-22 PROCEDURE — 25000003 PHARM REV CODE 250: Performed by: PHYSICIAN ASSISTANT

## 2023-05-22 PROCEDURE — 99284 EMERGENCY DEPT VISIT MOD MDM: CPT | Mod: ,,, | Performed by: EMERGENCY MEDICINE

## 2023-05-22 PROCEDURE — 99284 EMERGENCY DEPT VISIT MOD MDM: CPT

## 2023-05-22 PROCEDURE — 86803 HEPATITIS C AB TEST: CPT | Performed by: PHYSICIAN ASSISTANT

## 2023-05-22 PROCEDURE — 80047 BASIC METABLC PNL IONIZED CA: CPT

## 2023-05-22 PROCEDURE — 87389 HIV-1 AG W/HIV-1&-2 AB AG IA: CPT | Performed by: PHYSICIAN ASSISTANT

## 2023-05-22 PROCEDURE — 99284 PR EMERGENCY DEPT VISIT,LEVEL IV: ICD-10-PCS | Mod: ,,, | Performed by: EMERGENCY MEDICINE

## 2023-05-22 PROCEDURE — 85025 COMPLETE CBC W/AUTO DIFF WBC: CPT | Performed by: EMERGENCY MEDICINE

## 2023-05-22 PROCEDURE — 83880 ASSAY OF NATRIURETIC PEPTIDE: CPT | Performed by: EMERGENCY MEDICINE

## 2023-05-22 RX ORDER — CEPHALEXIN 500 MG/1
500 CAPSULE ORAL 4 TIMES DAILY
Qty: 20 CAPSULE | Refills: 0 | Status: SHIPPED | OUTPATIENT
Start: 2023-05-22 | End: 2023-05-31

## 2023-05-22 RX ORDER — MUPIROCIN 20 MG/G
1 OINTMENT TOPICAL
Status: COMPLETED | OUTPATIENT
Start: 2023-05-22 | End: 2023-05-22

## 2023-05-22 RX ORDER — CEPHALEXIN 500 MG/1
500 CAPSULE ORAL
Status: COMPLETED | OUTPATIENT
Start: 2023-05-22 | End: 2023-05-22

## 2023-05-22 RX ORDER — MUPIROCIN 20 MG/G
OINTMENT TOPICAL 3 TIMES DAILY
Qty: 30 G | Refills: 0 | Status: SHIPPED | OUTPATIENT
Start: 2023-05-22 | End: 2023-05-31

## 2023-05-22 RX ORDER — HYDROXYZINE PAMOATE 25 MG/1
25 CAPSULE ORAL EVERY 8 HOURS PRN
Qty: 21 CAPSULE | Refills: 0 | Status: SHIPPED | OUTPATIENT
Start: 2023-05-22 | End: 2023-05-31

## 2023-05-22 RX ADMIN — CEPHALEXIN 500 MG: 500 CAPSULE ORAL at 10:05

## 2023-05-22 RX ADMIN — MUPIROCIN 22 G: 20 OINTMENT TOPICAL at 09:05

## 2023-05-22 NOTE — FIRST PROVIDER EVALUATION
"Medical screening examination initiated.  I have conducted a focused provider triage encounter, findings are as follows:    Brief history of present illness:  73 y/o hx PVD and schizophrenia presents c/o drainage from right popliteal area and chronic skin changes    Vitals:    05/22/23 1826   BP: (!) 150/72   Pulse: 88   Resp: 15   Temp: 97.7 °F (36.5 °C)   TempSrc: Oral   SpO2: 99%   Weight: 97.5 kg (215 lb)   Height: 5' 7" (1.702 m)       Pertinent physical exam:  anxious, ambulatory unassisted, 2+ peripheral edema, chronic bilateral skin changes    Brief workup plan:  basic labs, wound care    Preliminary workup initiated; this workup will be continued and followed by the physician or advanced practice provider that is assigned to the patient when roomed.  "

## 2023-05-23 NOTE — DISCHARGE INSTRUCTIONS
You were seen due to the rash on your legs. I suspect this is due to a circulation issue. Please follow up with your PCP. Please continue with your dermatology appointment tomorrow as well. Please use emmollient lotion to help with the dry skin. I have prescribed you antibiotics and topical antibiotic due to concern for an early infection. Return to the Ed sooner if you develop any new or worsening symptoms.

## 2023-05-23 NOTE — ED PROVIDER NOTES
Encounter Date: 5/22/2023       History     Chief Complaint   Patient presents with    Wound Check     Behind both legs        72 year old male with pmh of schizophrenia, venous insufficiency, HLD, CKD stage 3, edema presents to the ED for wound check. Pt has 3 weeks of dry itchy rash to the bilateral legs. Was seen for this in the ED 2 days ago as well as 4/25/23. Has chronic swelling to his LE which he just refilled his lasix 2 days ago. Denies any chest pain or shortness of breath. Reports itching with some improvement with atarax. States today began having weeping and foul odor from behind both knees.       Review of patient's allergies indicates:  No Known Allergies  Past Medical History:   Diagnosis Date    Arthritis     CKD (chronic kidney disease), stage III     Edema     Hyperlipidemia     Schizophrenia, paranoid type     Venous insufficiency      Past Surgical History:   Procedure Laterality Date    no sx       Family History   Problem Relation Age of Onset    Blindness Mother     Glaucoma Mother     Heart disease Mother     Edema Mother     Cataracts Father     Heart disease Father     Diabetes Father     Deep vein thrombosis Sister     No Known Problems Brother     No Known Problems Maternal Aunt     No Known Problems Maternal Uncle     No Known Problems Paternal Aunt     No Known Problems Paternal Uncle     No Known Problems Maternal Grandmother     No Known Problems Maternal Grandfather     No Known Problems Paternal Grandmother     No Known Problems Paternal Grandfather     Amblyopia Neg Hx     Macular degeneration Neg Hx     Retinal detachment Neg Hx     Strabismus Neg Hx     Cancer Neg Hx     Hypertension Neg Hx     Stroke Neg Hx     Thyroid disease Neg Hx      Social History     Tobacco Use    Smoking status: Never    Smokeless tobacco: Never   Substance Use Topics    Alcohol use: No    Drug use: No     Review of Systems   Constitutional:  Negative for chills and fever.   HENT:  Negative for sore  throat.    Respiratory:  Negative for cough and shortness of breath.    Cardiovascular:  Positive for leg swelling. Negative for chest pain.   Gastrointestinal:  Negative for abdominal pain.   Genitourinary:  Negative for difficulty urinating.   Skin:  Positive for color change and rash.   Neurological:  Negative for weakness.   Psychiatric/Behavioral:  Negative for confusion.      Physical Exam     Initial Vitals [05/22/23 1826]   BP Pulse Resp Temp SpO2   (!) 150/72 88 15 97.7 °F (36.5 °C) 99 %      MAP       --         Physical Exam    Nursing note and vitals reviewed.  Constitutional: He appears well-developed and well-nourished.   HENT:   Head: Normocephalic and atraumatic.   Eyes: Conjunctivae are normal. Pupils are equal, round, and reactive to light.   Neck: Neck supple.   Normal range of motion.  Cardiovascular:  Normal rate, regular rhythm, normal heart sounds and intact distal pulses.           Pulmonary/Chest: Breath sounds normal.   Abdominal: Abdomen is soft. Bowel sounds are normal.   Musculoskeletal:         General: Normal range of motion.      Cervical back: Normal range of motion and neck supple.     Neurological: He is alert and oriented to person, place, and time. GCS score is 15. GCS eye subscore is 4. GCS verbal subscore is 5. GCS motor subscore is 6.   Skin: Skin is dry. Capillary refill takes less than 2 seconds.   Dry eczematous rash to bilateral LE. 2+ pitting edema. DP/PT presents by doppler. Bilateral popliteal region macerated with mild weeping.    Psychiatric: He has a normal mood and affect.       ED Course   Procedures          Labs Reviewed   CBC W/ AUTO DIFFERENTIAL - Abnormal; Notable for the following components:       Result Value    MCH 31.2 (*)     Lymph # 0.8 (*)     Eos # 0.6 (*)     Lymph % 13.8 (*)     Eosinophil % 10.4 (*)     All other components within normal limits    Narrative:     Release to patient->Immediate   ISTAT PROCEDURE - Abnormal; Notable for the following  components:    POC TCO2 (MEASURED) 20 (*)     POC Ionized Calcium 0.96 (*)     All other components within normal limits   HIV 1 / 2 ANTIBODY    Narrative:     Release to patient->Immediate   HEPATITIS C ANTIBODY    Narrative:     Release to patient->Immediate   B-TYPE NATRIURETIC PEPTIDE    Narrative:     Release to patient->Immediate          Imaging Results    None          Medications   mupirocin 2 % ointment 22 g (22 g Topical (Top) Given 5/22/23 2110)   cephALEXin capsule 500 mg (500 mg Oral Given 5/22/23 2227)     Medical Decision Making:   History:   Old Medical Records: I decided to obtain old medical records.  Initial Assessment:   72 year old male presents for wound check  Differential Diagnosis:   Stasis dermatitis, cellulitis, abscess, dermatitis, CHF exacerbation, SANJAY  Clinical Tests:   Lab Tests: Reviewed and Ordered  ED Management:  On exam extremely dry skin. Appears chronic and I suspect stasis dermatitis. DP/PT present by doppler and foot is warm. Weeping and foul odor behind his knees. I am concerned for early cellulitis will cover with mupirocin, and keflex. No leukocytosis. BNP Wnl and he denies any chest pain or shortness of breath. Discussed topical emmollient due to his dry skin. He has a dermatology appointment tomorrow and I encouraged him to keep that appointment. Discussed PCP follow up as well.            ED Course as of 05/23/23 0116   Mon May 22, 2023   2025 WBC: 5.79  No leukocytosis   [HJ]   2028 BNP: 27 [HJ]      ED Course User Index  [HJ] Kristal Duque PA-C                 Clinical Impression:   Final diagnoses:  [Z51.89] Visit for wound check (Primary)  [I87.2] Venous stasis dermatitis of both lower extremities        ED Disposition Condition    Discharge Stable          ED Prescriptions       Medication Sig Dispense Start Date End Date Auth. Provider    mupirocin (BACTROBAN) 2 % ointment Apply topically 3 (three) times daily. for 7 days 30 g 5/22/2023 5/29/2023 Kristal Duque PA-C     hydrOXYzine pamoate (VISTARIL) 25 MG Cap Take 1 capsule (25 mg total) by mouth every 8 (eight) hours as needed (itching). 21 capsule 5/22/2023 5/29/2023 Kristal Duque PA-C    cephALEXin (KEFLEX) 500 MG capsule Take 1 capsule (500 mg total) by mouth 4 (four) times daily. for 5 days 20 capsule 5/22/2023 5/27/2023 Kritsal Duque PA-C          Follow-up Information       Follow up With Specialties Details Why Contact Info    Dickson Laurent MD Internal Medicine Schedule an appointment as soon as possible for a visit   [id number]  [place] [address]  [phone]               Kristal Duque PA-C  05/23/23 0116

## 2023-05-23 NOTE — ED NOTES
I-STAT Chem-8+ Results:   Value Reference Range   Sodium 138 136-145 mmol/L   Potassium  4.4 3.5-5.1 mmol/L   Chloride 106  mmol/L   Ionized Calcium 0.96 1.06-1.42 mmol/L   CO2 (measured) 20 23-29 mmol/L   Glucose 99  mg/dL   BUN 17 6-30 mg/dL   Creatinine 1.4 0.5-1.4 mg/dL   Hematocrit 53 36-54%

## 2023-05-24 ENCOUNTER — OFFICE VISIT (OUTPATIENT)
Dept: INTERNAL MEDICINE | Facility: CLINIC | Age: 73
End: 2023-05-24
Payer: MEDICARE

## 2023-05-24 VITALS
SYSTOLIC BLOOD PRESSURE: 112 MMHG | BODY MASS INDEX: 32.98 KG/M2 | HEIGHT: 67 IN | WEIGHT: 210.13 LBS | OXYGEN SATURATION: 99 % | DIASTOLIC BLOOD PRESSURE: 64 MMHG | HEART RATE: 72 BPM

## 2023-05-24 DIAGNOSIS — I89.0 LYMPHEDEMA OF BOTH LOWER EXTREMITIES: ICD-10-CM

## 2023-05-24 DIAGNOSIS — I87.2 CHRONIC VENOUS INSUFFICIENCY OF LOWER EXTREMITY: Primary | ICD-10-CM

## 2023-05-24 DIAGNOSIS — I87.2 CHRONIC VENOUS STASIS DERMATITIS OF BOTH LOWER EXTREMITIES: ICD-10-CM

## 2023-05-24 PROCEDURE — 99214 OFFICE O/P EST MOD 30 MIN: CPT | Mod: S$PBB,,, | Performed by: INTERNAL MEDICINE

## 2023-05-24 PROCEDURE — 99213 OFFICE O/P EST LOW 20 MIN: CPT | Mod: PBBFAC | Performed by: INTERNAL MEDICINE

## 2023-05-24 PROCEDURE — 99999 PR PBB SHADOW E&M-EST. PATIENT-LVL III: ICD-10-PCS | Mod: PBBFAC,,, | Performed by: INTERNAL MEDICINE

## 2023-05-24 PROCEDURE — 99214 PR OFFICE/OUTPT VISIT, EST, LEVL IV, 30-39 MIN: ICD-10-PCS | Mod: S$PBB,,, | Performed by: INTERNAL MEDICINE

## 2023-05-24 PROCEDURE — 99999 PR PBB SHADOW E&M-EST. PATIENT-LVL III: CPT | Mod: PBBFAC,,, | Performed by: INTERNAL MEDICINE

## 2023-05-24 RX ORDER — METOLAZONE 5 MG/1
5 TABLET ORAL DAILY
Qty: 7 TABLET | Refills: 0 | Status: SHIPPED | OUTPATIENT
Start: 2023-05-24 | End: 2023-05-31

## 2023-05-24 RX ORDER — TRIAMCINOLONE ACETONIDE 1 MG/G
1 OINTMENT TOPICAL 2 TIMES DAILY
COMMUNITY
Start: 2023-05-24

## 2023-05-24 RX ORDER — PREDNISONE 20 MG/1
TABLET ORAL
Qty: 18 TABLET | Refills: 0 | Status: SHIPPED | OUTPATIENT
Start: 2023-05-24

## 2023-05-24 NOTE — PROGRESS NOTES
MEDICAL HISTORY:  Hypertension.  Lymphedema of both legs.  Chronic venous insufficiency  Schizophrenia  Chronic kidney disease stage 3  Statin-induced myalgias.     SOCIAL HISTORY:  Tobacco use, none.  Alcohol use, none       72-year-old male  Appointment was made in regard to worsening leg swelling for the past 3 weeks.  It is noted that he has been to the emergency room on   April  25th May 20th f May 22nd for this condition   He is not certain if there is improvement in he is noticing some degree of discomfort as well as itching.  He is also notice some fluid from the skin coming from the upper calf area.      During this time there has been no chest pain, palpitations, shortness a breath, abdominal pain.      It has been given a diagnosis of venous stasis and venous insufficiency.      Labs of chemistry CBC, BNP has been unrevealing     Lasix starting at 20 mg was initiated now the doses 40 mg along with Micro-K 20 mEq.  Two days ago cephalexin 500 mg 4 times a day and triamcinolone ointment has been prescribed.      Examination   Weight 210 lb   BMI 32.91   Pulse 70   Blood pressure 120/62   Chest clear breath sounds  Heart regular rate and rhythm, no murmurs gallops  Abdominal exam nontender, soft, no hepatosplenomegaly abdominal masses  Extremities 3+ tense edema of the distal lower extremity up to the knee.  Skin is hyperemic and scaly.  Slight scaling of the skin in thickness involving the upper leg.  Trace pedal pulses.  There is no vesicles or blisters     Impression   Lower extremity edema due to that of chronic venous insufficiency and lymphedema   Venous stasis dermatitis     Plan   Trial prednisone over the next 9 days  Continue with prednisone in the Micro-K and will add metolazone 5 mg for a week   A printout of the medications were given because when I review the medications with him there is no confidence t that I have with him know in his medications     He has a return appointment with me on May  31st     Addendum hydroxyzine 25 mg 3 to 4 times a day was also prescribed for itching

## 2023-05-25 ENCOUNTER — TELEPHONE (OUTPATIENT)
Dept: OPHTHALMOLOGY | Facility: CLINIC | Age: 73
End: 2023-05-25
Payer: MEDICARE

## 2023-05-31 ENCOUNTER — OFFICE VISIT (OUTPATIENT)
Dept: INTERNAL MEDICINE | Facility: CLINIC | Age: 73
End: 2023-05-31
Payer: MEDICARE

## 2023-05-31 ENCOUNTER — OFFICE VISIT (OUTPATIENT)
Dept: DERMATOLOGY | Facility: CLINIC | Age: 73
End: 2023-05-31
Payer: MEDICARE

## 2023-05-31 ENCOUNTER — LAB VISIT (OUTPATIENT)
Dept: LAB | Facility: HOSPITAL | Age: 73
End: 2023-05-31
Attending: INTERNAL MEDICINE
Payer: MEDICARE

## 2023-05-31 VITALS
DIASTOLIC BLOOD PRESSURE: 64 MMHG | SYSTOLIC BLOOD PRESSURE: 118 MMHG | OXYGEN SATURATION: 97 % | HEIGHT: 67 IN | WEIGHT: 199.31 LBS | BODY MASS INDEX: 31.28 KG/M2 | HEART RATE: 77 BPM

## 2023-05-31 DIAGNOSIS — I87.2 CHRONIC VENOUS INSUFFICIENCY: ICD-10-CM

## 2023-05-31 DIAGNOSIS — I87.2 VENOUS STASIS DERMATITIS, UNSPECIFIED LATERALITY: ICD-10-CM

## 2023-05-31 DIAGNOSIS — L27.0: ICD-10-CM

## 2023-05-31 DIAGNOSIS — T50.905A ACUTE GENERALIZED EXANTHEMATOUS PUSTULOSIS DUE TO DRUG: Primary | ICD-10-CM

## 2023-05-31 DIAGNOSIS — L27.0 ACUTE GENERALIZED EXANTHEMATOUS PUSTULOSIS DUE TO DRUG: Primary | ICD-10-CM

## 2023-05-31 DIAGNOSIS — I89.0 LYMPHEDEMA OF BOTH LOWER EXTREMITIES: ICD-10-CM

## 2023-05-31 DIAGNOSIS — I87.2 VENOUS STASIS DERMATITIS OF BOTH LOWER EXTREMITIES: Primary | ICD-10-CM

## 2023-05-31 DIAGNOSIS — I87.2 VENOUS STASIS DERMATITIS OF BOTH LOWER EXTREMITIES: ICD-10-CM

## 2023-05-31 LAB
ANION GAP SERPL CALC-SCNC: 15 MMOL/L (ref 8–16)
BUN SERPL-MCNC: 41 MG/DL (ref 8–23)
CALCIUM SERPL-MCNC: 11 MG/DL (ref 8.7–10.5)
CHLORIDE SERPL-SCNC: 100 MMOL/L (ref 95–110)
CO2 SERPL-SCNC: 27 MMOL/L (ref 23–29)
CREAT SERPL-MCNC: 1.9 MG/DL (ref 0.5–1.4)
EST. GFR  (NO RACE VARIABLE): 37 ML/MIN/1.73 M^2
GLUCOSE SERPL-MCNC: 104 MG/DL (ref 70–110)
MAGNESIUM SERPL-MCNC: 2.3 MG/DL (ref 1.6–2.6)
POTASSIUM SERPL-SCNC: 5.1 MMOL/L (ref 3.5–5.1)
SODIUM SERPL-SCNC: 142 MMOL/L (ref 136–145)
TSH SERPL DL<=0.005 MIU/L-ACNC: 2.31 UIU/ML (ref 0.4–4)

## 2023-05-31 PROCEDURE — 83735 ASSAY OF MAGNESIUM: CPT | Performed by: INTERNAL MEDICINE

## 2023-05-31 PROCEDURE — 11104 PUNCH BX SKIN SINGLE LESION: CPT | Mod: PBBFAC | Performed by: STUDENT IN AN ORGANIZED HEALTH CARE EDUCATION/TRAINING PROGRAM

## 2023-05-31 PROCEDURE — 11104 PUNCH BX SKIN SINGLE LESION: CPT | Mod: S$PBB,,, | Performed by: STUDENT IN AN ORGANIZED HEALTH CARE EDUCATION/TRAINING PROGRAM

## 2023-05-31 PROCEDURE — 99213 OFFICE O/P EST LOW 20 MIN: CPT | Mod: PBBFAC,27 | Performed by: INTERNAL MEDICINE

## 2023-05-31 PROCEDURE — 99203 OFFICE O/P NEW LOW 30 MIN: CPT | Mod: 25,S$PBB,, | Performed by: STUDENT IN AN ORGANIZED HEALTH CARE EDUCATION/TRAINING PROGRAM

## 2023-05-31 PROCEDURE — 88305 TISSUE EXAM BY PATHOLOGIST: CPT | Performed by: PATHOLOGY

## 2023-05-31 PROCEDURE — 11104 PR PUNCH BIOPSY, SKIN, SINGLE LESION: ICD-10-PCS | Mod: S$PBB,,, | Performed by: STUDENT IN AN ORGANIZED HEALTH CARE EDUCATION/TRAINING PROGRAM

## 2023-05-31 PROCEDURE — 84443 ASSAY THYROID STIM HORMONE: CPT | Mod: GA | Performed by: INTERNAL MEDICINE

## 2023-05-31 PROCEDURE — 99999 PR PBB SHADOW E&M-EST. PATIENT-LVL III: CPT | Mod: PBBFAC,,, | Performed by: INTERNAL MEDICINE

## 2023-05-31 PROCEDURE — 36415 COLL VENOUS BLD VENIPUNCTURE: CPT | Performed by: INTERNAL MEDICINE

## 2023-05-31 PROCEDURE — 99213 OFFICE O/P EST LOW 20 MIN: CPT | Mod: PBBFAC,25 | Performed by: STUDENT IN AN ORGANIZED HEALTH CARE EDUCATION/TRAINING PROGRAM

## 2023-05-31 PROCEDURE — 88305 TISSUE EXAM BY PATHOLOGIST: CPT | Mod: 26,,, | Performed by: PATHOLOGY

## 2023-05-31 PROCEDURE — 99214 OFFICE O/P EST MOD 30 MIN: CPT | Mod: S$PBB,,, | Performed by: INTERNAL MEDICINE

## 2023-05-31 PROCEDURE — 99999 PR PBB SHADOW E&M-EST. PATIENT-LVL III: CPT | Mod: PBBFAC,,, | Performed by: STUDENT IN AN ORGANIZED HEALTH CARE EDUCATION/TRAINING PROGRAM

## 2023-05-31 PROCEDURE — 88305 TISSUE EXAM BY PATHOLOGIST: ICD-10-PCS | Mod: 26,,, | Performed by: PATHOLOGY

## 2023-05-31 PROCEDURE — 99999 PR PBB SHADOW E&M-EST. PATIENT-LVL III: ICD-10-PCS | Mod: PBBFAC,,, | Performed by: INTERNAL MEDICINE

## 2023-05-31 PROCEDURE — 80048 BASIC METABOLIC PNL TOTAL CA: CPT | Performed by: INTERNAL MEDICINE

## 2023-05-31 PROCEDURE — 87070 CULTURE OTHR SPECIMN AEROBIC: CPT | Performed by: STUDENT IN AN ORGANIZED HEALTH CARE EDUCATION/TRAINING PROGRAM

## 2023-05-31 PROCEDURE — 99214 PR OFFICE/OUTPT VISIT, EST, LEVL IV, 30-39 MIN: ICD-10-PCS | Mod: S$PBB,,, | Performed by: INTERNAL MEDICINE

## 2023-05-31 PROCEDURE — 99999 PR PBB SHADOW E&M-EST. PATIENT-LVL III: ICD-10-PCS | Mod: PBBFAC,,, | Performed by: STUDENT IN AN ORGANIZED HEALTH CARE EDUCATION/TRAINING PROGRAM

## 2023-05-31 PROCEDURE — 99203 PR OFFICE/OUTPT VISIT, NEW, LEVL III, 30-44 MIN: ICD-10-PCS | Mod: 25,S$PBB,, | Performed by: STUDENT IN AN ORGANIZED HEALTH CARE EDUCATION/TRAINING PROGRAM

## 2023-05-31 RX ORDER — TRIAMCINOLONE ACETONIDE 1 MG/G
CREAM TOPICAL 2 TIMES DAILY
Qty: 454 G | Refills: 1 | Status: SHIPPED | OUTPATIENT
Start: 2023-05-31

## 2023-05-31 RX ORDER — PREDNISONE 10 MG/1
TABLET ORAL
Qty: 30 TABLET | Refills: 0 | Status: SHIPPED | OUTPATIENT
Start: 2023-05-31 | End: 2023-06-12

## 2023-05-31 NOTE — PATIENT INSTRUCTIONS

## 2023-05-31 NOTE — PROGRESS NOTES
Subjective:      Patient ID:  Oral Gonzales is a 72 y.o. male who presents for   Chief Complaint   Patient presents with    Rash     HPI    Patient has h/o pmh of schizophrenia, venous insufficiency, HLD, CKD stage 3. Worsening leg swelling over last month. He went to the emergency department 4/25/23 and was given lasix, dexamethaseone. He went back to ED on 5/20 and was given hydroxyzine. Back to ED on 5/22 and was given keflex and hydoxyzine. Saw PCP 5/24 and was given prednisone and hydroxyzine, which he did not feel helped    He feels the itchy rash worsened after lasix    Review of Systems    Objective:   Physical Exam   Constitutional: He appears well-developed and well-nourished. No distress.   Neurological: He is alert and oriented to person, place, and time. He is not disoriented.   Psychiatric: He has a normal mood and affect.   Skin:   Areas Examined (abnormalities noted in diagram):   Scalp / Hair Palpated and Inspected  Head / Face Inspection Performed  Neck Inspection Performed  Abdomen Inspection Performed  Back Inspection Performed  RUE Inspected  LUE Inspection Performed  RLE Inspected  LLE Inspection Performed          Diagram Legend     Erythematous scaling macule/papule c/w actinic keratosis       Vascular papule c/w angioma      Pigmented verrucoid papule/plaque c/w seborrheic keratosis      Yellow umbilicated papule c/w sebaceous hyperplasia      Irregularly shaped tan macule c/w lentigo     1-2 mm smooth white papules consistent with Milia      Movable subcutaneous cyst with punctum c/w epidermal inclusion cyst      Subcutaneous movable cyst c/w pilar cyst      Firm pink to brown papule c/w dermatofibroma      Pedunculated fleshy papule(s) c/w skin tag(s)      Evenly pigmented macule c/w junctional nevus     Mildly variegated pigmented, slightly irregular-bordered macule c/w mildly atypical nevus      Flesh colored to evenly pigmented papule c/w intradermal nevus       Pink pearly  papule/plaque c/w basal cell carcinoma      Erythematous hyperkeratotic cursted plaque c/w SCC      Surgical scar with no sign of skin cancer recurrence      Open and closed comedones      Inflammatory papules and pustules      Verrucoid papule consistent consistent with wart     Erythematous eczematous patches and plaques     Dystrophic onycholytic nail with subungual debris c/w onychomycosis     Umbilicated papule    Erythematous-base heme-crusted tan verrucoid plaque consistent with inflamed seborrheic keratosis     Erythematous Silvery Scaling Plaque c/w Psoriasis     See annotation                Assessment / Plan:      Pathology Orders:       Normal Orders This Visit    Specimen to Pathology, Dermatology     Questions:    Procedure Type: Dermatology and skin neoplasms    Number of Specimens: 1    ------------------------: -------------------------    Spec 1 Procedure: Biopsy    Spec 1 Clinical Impression: agep vs pustular psoriasis vs other    Spec 1 Source: right thigh    Release to patient: Immediate          Acute generalized exanthematous pustulosis due to drug--Component of venous stasis dermatitis with chronic stasis changes on legs but also with erythematous nonfollicular pustular eruption on b/l thighs. Favor pustular drug eruption. Ddx could include pustular psoriasis. Do not favor infection but not typical morphology but possible. Id reaction though not typical pustular  - Stop lasix and keflex and any new medicatoins that were started in last 2 months  - TAC 0.1% ointment / cream BID  - culture as below to r/o infection  - counseled on s/s of severe cutaneous drug eruption such as fever, mucosal involvement, diffuse rash, skin pain, etc and counseled to go to ER if these develop  -     Specimen to Pathology, Dermatology  -     Aerobic culture  -     predniSONE (DELTASONE) 10 MG tablet; Take 4 tablets (40 mg total) by mouth once daily for 3 days, THEN 3 tablets (30 mg total) once daily for 3 days,  THEN 2 tablets (20 mg total) once daily for 3 days, THEN 1 tablet (10 mg total) once daily for 3 days.  Dispense: 30 tablet; Refill: 0  -     triamcinolone acetonide 0.1% (KENALOG) 0.1 % cream; Apply topically 2 (two) times daily. Use to affected areas for up to 2 weeks to legs  Dispense: 454 g; Refill: 1    Punch biopsy procedure note:  Punch biopsy performed after verbal consent obtained. Area marked and prepped with alcohol. Approximately 1cc of 1% lidocaine with epinephrine injected. 3 mm disposable punch used to remove lesion. Hemostasis obtained and biopsy site closed with 1 - 2 Prolene sutures. Wound care instructions reviewed with patient and handout given.    Venous stasis dermatitis, unspecified laterality  - compression socks + TAC ointment. Will refer to vascular surgery once pustular eruption on thighs improve           Follow up in about 1 week (around 6/7/2023).

## 2023-05-31 NOTE — PROGRESS NOTES
MEDICAL HISTORY:  Hypertension.  Lymphedema of both legs.  Chronic venous insufficiency  Schizophrenia  Chronic kidney disease stage 3  Statin-induced myalgias.     SOCIAL HISTORY:  Tobacco use, none.  Alcohol use, none    72-year-old male  Presents for follow-up regarding dermatitis and edema of the lower extremity.  He was seen on May 24th.    As noted from the visit he was already on prednisone 40 mg daily Micro-K 10 mEq daily that was initiated on May 20th after visiting emergency room.  Also cephalexin was prescribed for treatment of cellulitis.    From his visit with me metolazone 5 mg daily for 7 days and prednisone for 9 days was prescribed.  Apparently it was sent to a different [facility] and then later transferred to another [facility] and he may have started both medications either May 27th or May 28th.  He does not know for sure    He was seen by dermatology today and had a biopsy in the right upper thigh.  He was diagnosed with stasis dermatitis as well as in generalize erythematous rash due to drug.  He was recommended to put a hold on Lasix and cephalexin    He was also given a new prescription of prednisone dose regimen and triamcinolone lean ointment    He reports no shortness a breath chest pain or abdominal pain    Exam   Weight 199, last week was 210 lb  Pulse 76   Blood pressure 120/62   Chest clear breath sounds  Heart regular rate and rhythm   Abdominal exam soft nontender   Lower extremities 2+ edema generalized hyperemia and erythematous macules papules and upper leg    Impression  Stasis dermatitis   Allergic dermatitis due to drug reaction   Chronic venous insufficiency  Hypertension    Plan   Basic metabolic profile and TSH today  Print out of his medicines were discussed and will ask that he stops furosemide Potassium metolazone ,stop the prednisone that prescribed by the dermatologist

## 2023-06-03 LAB — BACTERIA SPEC AEROBE CULT: NO GROWTH

## 2023-06-06 ENCOUNTER — TELEPHONE (OUTPATIENT)
Dept: ADMINISTRATIVE | Facility: HOSPITAL | Age: 73
End: 2023-06-06
Payer: MEDICARE

## 2023-06-06 LAB
FINAL PATHOLOGIC DIAGNOSIS: NORMAL
GROSS: NORMAL
Lab: NORMAL
MICROSCOPIC EXAM: NORMAL

## 2023-06-06 NOTE — PROGRESS NOTES
Biopsy c/w AGEP, which was my clinical suspicion. He is already on the appropriate treatment. C/w current plan

## 2023-06-07 ENCOUNTER — LAB VISIT (OUTPATIENT)
Dept: LAB | Facility: HOSPITAL | Age: 73
End: 2023-06-07
Attending: STUDENT IN AN ORGANIZED HEALTH CARE EDUCATION/TRAINING PROGRAM
Payer: MEDICARE

## 2023-06-07 ENCOUNTER — OFFICE VISIT (OUTPATIENT)
Dept: DERMATOLOGY | Facility: CLINIC | Age: 73
End: 2023-06-07
Payer: MEDICARE

## 2023-06-07 DIAGNOSIS — T50.905A ACUTE GENERALIZED EXANTHEMATOUS PUSTULOSIS DUE TO DRUG: Primary | ICD-10-CM

## 2023-06-07 DIAGNOSIS — T50.905A ACUTE GENERALIZED EXANTHEMATOUS PUSTULOSIS DUE TO DRUG: ICD-10-CM

## 2023-06-07 DIAGNOSIS — L27.0 ACUTE GENERALIZED EXANTHEMATOUS PUSTULOSIS DUE TO DRUG: Primary | ICD-10-CM

## 2023-06-07 DIAGNOSIS — L27.0 ACUTE GENERALIZED EXANTHEMATOUS PUSTULOSIS DUE TO DRUG: ICD-10-CM

## 2023-06-07 LAB
ALBUMIN SERPL BCP-MCNC: 3.7 G/DL (ref 3.5–5.2)
ALP SERPL-CCNC: 94 U/L (ref 55–135)
ALT SERPL W/O P-5'-P-CCNC: 25 U/L (ref 10–44)
ANION GAP SERPL CALC-SCNC: 9 MMOL/L (ref 8–16)
AST SERPL-CCNC: 24 U/L (ref 10–40)
BASOPHILS # BLD AUTO: 0.04 K/UL (ref 0–0.2)
BASOPHILS NFR BLD: 0.4 % (ref 0–1.9)
BILIRUB SERPL-MCNC: 0.4 MG/DL (ref 0.1–1)
BUN SERPL-MCNC: 25 MG/DL (ref 8–23)
CALCIUM SERPL-MCNC: 9.5 MG/DL (ref 8.7–10.5)
CHLORIDE SERPL-SCNC: 107 MMOL/L (ref 95–110)
CO2 SERPL-SCNC: 28 MMOL/L (ref 23–29)
CREAT SERPL-MCNC: 1.4 MG/DL (ref 0.5–1.4)
DIFFERENTIAL METHOD: ABNORMAL
EOSINOPHIL # BLD AUTO: 0.9 K/UL (ref 0–0.5)
EOSINOPHIL NFR BLD: 8.8 % (ref 0–8)
ERYTHROCYTE [DISTWIDTH] IN BLOOD BY AUTOMATED COUNT: 13 % (ref 11.5–14.5)
EST. GFR  (NO RACE VARIABLE): 53.4 ML/MIN/1.73 M^2
GLUCOSE SERPL-MCNC: 102 MG/DL (ref 70–110)
HCT VFR BLD AUTO: 47.5 % (ref 40–54)
HGB BLD-MCNC: 14.8 G/DL (ref 14–18)
IMM GRANULOCYTES # BLD AUTO: 0.02 K/UL (ref 0–0.04)
IMM GRANULOCYTES NFR BLD AUTO: 0.2 % (ref 0–0.5)
LYMPHOCYTES # BLD AUTO: 1.7 K/UL (ref 1–4.8)
LYMPHOCYTES NFR BLD: 16.9 % (ref 18–48)
MCH RBC QN AUTO: 29.2 PG (ref 27–31)
MCHC RBC AUTO-ENTMCNC: 31.2 G/DL (ref 32–36)
MCV RBC AUTO: 94 FL (ref 82–98)
MONOCYTES # BLD AUTO: 1 K/UL (ref 0.3–1)
MONOCYTES NFR BLD: 10 % (ref 4–15)
NEUTROPHILS # BLD AUTO: 6.5 K/UL (ref 1.8–7.7)
NEUTROPHILS NFR BLD: 63.7 % (ref 38–73)
NRBC BLD-RTO: 0 /100 WBC
PLATELET # BLD AUTO: 178 K/UL (ref 150–450)
PMV BLD AUTO: 9.9 FL (ref 9.2–12.9)
POTASSIUM SERPL-SCNC: 4.1 MMOL/L (ref 3.5–5.1)
PROT SERPL-MCNC: 6.5 G/DL (ref 6–8.4)
RBC # BLD AUTO: 5.07 M/UL (ref 4.6–6.2)
SODIUM SERPL-SCNC: 144 MMOL/L (ref 136–145)
WBC # BLD AUTO: 10.14 K/UL (ref 3.9–12.7)

## 2023-06-07 PROCEDURE — 99999 PR PBB SHADOW E&M-EST. PATIENT-LVL II: CPT | Mod: PBBFAC,,, | Performed by: STUDENT IN AN ORGANIZED HEALTH CARE EDUCATION/TRAINING PROGRAM

## 2023-06-07 PROCEDURE — 85025 COMPLETE CBC W/AUTO DIFF WBC: CPT | Performed by: STUDENT IN AN ORGANIZED HEALTH CARE EDUCATION/TRAINING PROGRAM

## 2023-06-07 PROCEDURE — 99213 OFFICE O/P EST LOW 20 MIN: CPT | Mod: S$PBB,,, | Performed by: STUDENT IN AN ORGANIZED HEALTH CARE EDUCATION/TRAINING PROGRAM

## 2023-06-07 PROCEDURE — 36415 COLL VENOUS BLD VENIPUNCTURE: CPT | Performed by: STUDENT IN AN ORGANIZED HEALTH CARE EDUCATION/TRAINING PROGRAM

## 2023-06-07 PROCEDURE — 99213 PR OFFICE/OUTPT VISIT, EST, LEVL III, 20-29 MIN: ICD-10-PCS | Mod: S$PBB,,, | Performed by: STUDENT IN AN ORGANIZED HEALTH CARE EDUCATION/TRAINING PROGRAM

## 2023-06-07 PROCEDURE — 99212 OFFICE O/P EST SF 10 MIN: CPT | Mod: PBBFAC | Performed by: STUDENT IN AN ORGANIZED HEALTH CARE EDUCATION/TRAINING PROGRAM

## 2023-06-07 PROCEDURE — 80053 COMPREHEN METABOLIC PANEL: CPT | Performed by: STUDENT IN AN ORGANIZED HEALTH CARE EDUCATION/TRAINING PROGRAM

## 2023-06-07 PROCEDURE — 99999 PR PBB SHADOW E&M-EST. PATIENT-LVL II: ICD-10-PCS | Mod: PBBFAC,,, | Performed by: STUDENT IN AN ORGANIZED HEALTH CARE EDUCATION/TRAINING PROGRAM

## 2023-06-07 NOTE — PROGRESS NOTES
Subjective:      Patient ID:  Oral Gonzales is a 72 y.o. male who presents for   Chief Complaint   Patient presents with    Rash     Rash - Follow-up  Symptom course: improving  SIGNS AND SYMPTOMS F/U: improving - taking prednisone and using TAC as prescribed.    Seen 5/31/23 for AGEP on legs likely due to lasix or keflex. He was given a pred taper from 40 mg and TAC ointment. Legs have improved somewhat but rash has spread to trunk. Denies fevers or mucosal involvement    Biopsy confirmed a diagnosis of AGEP    Patient denies AH/VH    Review of Systems   Skin:  Positive for rash.     Objective:   Physical Exam   Constitutional: He appears well-developed and well-nourished. No distress.   Neurological: He is alert and oriented to person, place, and time. He is not disoriented.   Psychiatric: He has a normal mood and affect.   Skin:   Areas Examined (abnormalities noted in diagram):   Scalp / Hair Palpated and Inspected  Head / Face Inspection Performed  Neck Inspection Performed  Chest / Axilla Inspection Performed  Abdomen Inspection Performed  Back Inspection Performed  RUE Inspected  LUE Inspection Performed  RLE Inspected  LLE Inspection Performed          Diagram Legend     Erythematous scaling macule/papule c/w actinic keratosis       Vascular papule c/w angioma      Pigmented verrucoid papule/plaque c/w seborrheic keratosis      Yellow umbilicated papule c/w sebaceous hyperplasia      Irregularly shaped tan macule c/w lentigo     1-2 mm smooth white papules consistent with Milia      Movable subcutaneous cyst with punctum c/w epidermal inclusion cyst      Subcutaneous movable cyst c/w pilar cyst      Firm pink to brown papule c/w dermatofibroma      Pedunculated fleshy papule(s) c/w skin tag(s)      Evenly pigmented macule c/w junctional nevus     Mildly variegated pigmented, slightly irregular-bordered macule c/w mildly atypical nevus      Flesh colored to evenly pigmented papule c/w intradermal nevus        Pink pearly papule/plaque c/w basal cell carcinoma      Erythematous hyperkeratotic cursted plaque c/w SCC      Surgical scar with no sign of skin cancer recurrence      Open and closed comedones      Inflammatory papules and pustules      Verrucoid papule consistent consistent with wart     Erythematous eczematous patches and plaques     Dystrophic onycholytic nail with subungual debris c/w onychomycosis     Umbilicated papule    Erythematous-base heme-crusted tan verrucoid plaque consistent with inflamed seborrheic keratosis     Erythematous Silvery Scaling Plaque c/w Psoriasis     See annotation      Assessment / Plan:        Acute generalized exanthematous pustulosis due to drug--Component of venous stasis dermatitis with chronic stasis changes on legs but also with erythematous nonfollicular pustular eruption on b/l thighs--now generalized to trunk and upper extremities c/w AGEP. Biopsy also supported diagnosis of AGEP.  Ddx could include pustular psoriasis. Likely culprits is lasix or keflex--continue to avoid  - cx at initial visit negative  - Patient on no other medications. On several supplements--recommended he discontinue them  - TAC 0.1% ointment / cream BID  - counseled on s/s of severe cutaneous drug eruption such as fever, mucosal involvement, diffuse rash, skin pain, etc and counseled to go to ER if these develop  - patient has a documented history of schizophrenia and psycosis in his problem list. He denies these and is on no medications. His mood was more hyperactive today and he had slightly more tangential thought process. He denied hallucinations and was able to understand the plan and converse logically discussing risks and benefits of treatment, but nonetheless, will recommend he stop the systemic steroids due to concern for possible steroid induced mood disturbance  - CBC and Cmp to r/o signs of DRESS. No facial involvement, lymphadenopathy or fever so low suspicion--CMP wnl, CBC with  slight eosinophilia, which can also be seen in AGEP   -     CBC Auto Differential; Future; Expected date: 06/07/2023  -     Comprehensive Metabolic Panel; Future; Expected date: 06/07/2023    Venous stasis dermatitis, unspecified laterality  - compression socks + TAC ointment. Will refer to vascular surgery once pustular eruption on thighs improve         Follow up in about 1 week (around 6/14/2023).

## 2023-08-01 ENCOUNTER — TELEPHONE (OUTPATIENT)
Dept: VASCULAR SURGERY | Facility: CLINIC | Age: 73
End: 2023-08-01
Payer: MEDICARE

## 2023-08-01 ENCOUNTER — TELEPHONE (OUTPATIENT)
Dept: WOUND CARE | Facility: CLINIC | Age: 73
End: 2023-08-01
Payer: MEDICARE

## 2023-08-01 NOTE — TELEPHONE ENCOUNTER
----- Message from Laura Vazquez sent at 8/1/2023 10:05 AM CDT -----  Regarding: Referral  Good morning,     I received a referral on behalf of the patient above from Kasandra Stern NP  requesting patient be seen for consult to evaluate and treat stasis of legs.  I have scanned the referral and notes into media mgr.  Please review and contact the patient to schedule.     Thank you,    Laura Vazquez  [facility]

## 2023-08-01 NOTE — TELEPHONE ENCOUNTER
----- Message from Laura Vazquez sent at 8/1/2023 10:05 AM CDT -----  Regarding: Referral  Good morning,     I received a referral on behalf of the patient above from Kasandra Stern NP  requesting patient be seen for consult to evaluate and treat stasis of legs.  I have scanned the referral and notes into media mgr.  Please review and contact the patient to schedule.     Thank you,    Laura Vazquez  [facility]

## 2023-08-01 NOTE — TELEPHONE ENCOUNTER
Called patient regarding referral for wound care services - offered/accepted appointment Thursday, 8/3/23 at 1pm.  Instructed patient to report to [facility] 5th floor clinic

## 2023-08-24 DIAGNOSIS — M79.606 PAIN AND SWELLING OF LOWER EXTREMITY, UNSPECIFIED LATERALITY: Primary | ICD-10-CM

## 2023-08-24 DIAGNOSIS — I80.03 PHLEBITIS AND THROMBOPHLEBITIS OF SUPERFICIAL VESSELS OF LOWER EXTREMITIES, BILATERAL: ICD-10-CM

## 2023-08-24 DIAGNOSIS — M79.89 PAIN AND SWELLING OF LOWER EXTREMITY, UNSPECIFIED LATERALITY: Primary | ICD-10-CM

## 2023-09-20 ENCOUNTER — HOSPITAL ENCOUNTER (EMERGENCY)
Facility: OTHER | Age: 73
Discharge: HOME | End: 2023-09-20
Attending: EMERGENCY MEDICINE
Payer: MEDICARE

## 2023-09-20 ENCOUNTER — HOSPITAL ENCOUNTER (OUTPATIENT)
Dept: RADIOLOGY | Facility: OTHER | Age: 73
Discharge: HOME | End: 2023-09-20
Attending: SURGERY
Payer: MEDICARE

## 2023-09-20 VITALS
OXYGEN SATURATION: 100 % | BODY MASS INDEX: 31.32 KG/M2 | TEMPERATURE: 98 F | DIASTOLIC BLOOD PRESSURE: 61 MMHG | HEART RATE: 63 BPM | RESPIRATION RATE: 20 BRPM | SYSTOLIC BLOOD PRESSURE: 134 MMHG | WEIGHT: 199.94 LBS

## 2023-09-20 DIAGNOSIS — I82.433 ACUTE DEEP VEIN THROMBOSIS (DVT) OF POPLITEAL VEIN OF BOTH LOWER EXTREMITIES: Primary | ICD-10-CM

## 2023-09-20 DIAGNOSIS — M79.89 PAIN AND SWELLING OF LOWER EXTREMITY, UNSPECIFIED LATERALITY: ICD-10-CM

## 2023-09-20 DIAGNOSIS — I80.03 PHLEBITIS AND THROMBOPHLEBITIS OF SUPERFICIAL VESSELS OF LOWER EXTREMITIES, BILATERAL: ICD-10-CM

## 2023-09-20 DIAGNOSIS — M79.606 PAIN AND SWELLING OF LOWER EXTREMITY, UNSPECIFIED LATERALITY: ICD-10-CM

## 2023-09-20 LAB
ALBUMIN SERPL BCP-MCNC: 4.1 G/DL (ref 3.5–5.2)
ALP SERPL-CCNC: 70 U/L (ref 55–135)
ALT SERPL W/O P-5'-P-CCNC: 27 U/L (ref 10–44)
ANION GAP SERPL CALC-SCNC: 9 MMOL/L (ref 8–16)
AST SERPL-CCNC: 35 U/L (ref 10–40)
BASOPHILS # BLD AUTO: 0.02 K/UL (ref 0–0.2)
BASOPHILS NFR BLD: 0.4 % (ref 0–1.9)
BILIRUB SERPL-MCNC: 0.2 MG/DL (ref 0.1–1)
BUN SERPL-MCNC: 14 MG/DL (ref 8–23)
CALCIUM SERPL-MCNC: 9.6 MG/DL (ref 8.7–10.5)
CHLORIDE SERPL-SCNC: 109 MMOL/L (ref 95–110)
CO2 SERPL-SCNC: 24 MMOL/L (ref 23–29)
CREAT SERPL-MCNC: 1.3 MG/DL (ref 0.5–1.4)
DIFFERENTIAL METHOD: ABNORMAL
EOSINOPHIL # BLD AUTO: 0.2 K/UL (ref 0–0.5)
EOSINOPHIL NFR BLD: 3.5 % (ref 0–8)
ERYTHROCYTE [DISTWIDTH] IN BLOOD BY AUTOMATED COUNT: 13.8 % (ref 11.5–14.5)
EST. GFR  (NO RACE VARIABLE): 58 ML/MIN/1.73 M^2
GLUCOSE SERPL-MCNC: 104 MG/DL (ref 70–110)
HCT VFR BLD AUTO: 47.6 % (ref 40–54)
HGB BLD-MCNC: 15 G/DL (ref 14–18)
IMM GRANULOCYTES # BLD AUTO: 0.01 K/UL (ref 0–0.04)
IMM GRANULOCYTES NFR BLD AUTO: 0.2 % (ref 0–0.5)
INR PPP: 0.9 (ref 0.8–1.2)
LYMPHOCYTES # BLD AUTO: 1.7 K/UL (ref 1–4.8)
LYMPHOCYTES NFR BLD: 31 % (ref 18–48)
MCH RBC QN AUTO: 29.9 PG (ref 27–31)
MCHC RBC AUTO-ENTMCNC: 31.5 G/DL (ref 32–36)
MCV RBC AUTO: 95 FL (ref 82–98)
MONOCYTES # BLD AUTO: 0.6 K/UL (ref 0.3–1)
MONOCYTES NFR BLD: 10.2 % (ref 4–15)
NEUTROPHILS # BLD AUTO: 3 K/UL (ref 1.8–7.7)
NEUTROPHILS NFR BLD: 54.7 % (ref 38–73)
NRBC BLD-RTO: 0 /100 WBC
PLATELET # BLD AUTO: 174 K/UL (ref 150–450)
PMV BLD AUTO: 9.5 FL (ref 9.2–12.9)
POTASSIUM BLD-SCNC: 4.8 MMOL/L (ref 3.5–5.1)
POTASSIUM SERPL-SCNC: 6 MMOL/L (ref 3.5–5.1)
PROT SERPL-MCNC: 7.4 G/DL (ref 6–8.4)
PROTHROMBIN TIME: 9.8 SEC (ref 9–12.5)
RBC # BLD AUTO: 5.01 M/UL (ref 4.6–6.2)
SAMPLE: NORMAL
SODIUM SERPL-SCNC: 142 MMOL/L (ref 136–145)
WBC # BLD AUTO: 5.39 K/UL (ref 3.9–12.7)

## 2023-09-20 PROCEDURE — 85610 PROTHROMBIN TIME: CPT | Performed by: EMERGENCY MEDICINE

## 2023-09-20 PROCEDURE — 80053 COMPREHEN METABOLIC PANEL: CPT | Performed by: EMERGENCY MEDICINE

## 2023-09-20 PROCEDURE — 99284 EMERGENCY DEPT VISIT MOD MDM: CPT | Mod: 25

## 2023-09-20 PROCEDURE — 93970 EXTREMITY STUDY: CPT | Mod: 26,,, | Performed by: RADIOLOGY

## 2023-09-20 PROCEDURE — 93970 US LOWER EXTREMITY VEINS BILATERAL: ICD-10-PCS | Mod: 26,,, | Performed by: RADIOLOGY

## 2023-09-20 PROCEDURE — 36415 COLL VENOUS BLD VENIPUNCTURE: CPT | Performed by: EMERGENCY MEDICINE

## 2023-09-20 PROCEDURE — 99900035 HC TECH TIME PER 15 MIN (STAT)

## 2023-09-20 PROCEDURE — 84132 ASSAY OF SERUM POTASSIUM: CPT | Mod: 91

## 2023-09-20 PROCEDURE — 93970 EXTREMITY STUDY: CPT | Mod: TC

## 2023-09-20 PROCEDURE — 85025 COMPLETE CBC W/AUTO DIFF WBC: CPT | Performed by: EMERGENCY MEDICINE

## 2023-09-20 NOTE — ED PROVIDER NOTES
"Encounter Date: 9/20/2023    SCRIBE #1 NOTE: I, Aaron Marie, am scribing for, and in the presence of,  Whitley Ayon MD. I have scribed the following portions of the note - Other sections scribed: HPI, ROS, PE.       History     Chief Complaint   Patient presents with    Deep Vein Thrombosis     Had US today and +DVT to BLE and MD called and sent to ED; patient refuses any medications     Time seen by physician: 10:36 AM    This is a 73 y.o. male who presents with complaint of swelling in his legs that began 4-5 months ago. The patient is a poor historian. He went to the ER 4-5 months ago for leg swelling and itching and was told he just had poor circulation with the provider telling him "there's nothing we can do". He later went to his PCP and was given steroids that he states made the situation worse. He now reports that his swelling has reduced and that he is here because someone ordered an US, but is not sure why they did or who ordered it, and was told to come here due to abnormal results. He denies any numbness or tingling in his legs.  He denies any pain in his legs.  He is not currently on blood thinners. He denies any drug use.  He denies any chest pain or shortness of breath.  Denies any fevers/chills.  Patient denies any other complaints at this time.    The history is provided by the patient.     Review of patient's allergies indicates:   Allergen Reactions    Keflex [cephalexin] Rash     Acute generalized exanthematous pustulosis after being given lasix and keflex--unclear which was culprit      Lasix [furosemide] Rash     Acute generalized exanthematous pustulosis after being given lasix and keflex--unclear which was culprit         Past Medical History:   Diagnosis Date    Arthritis     CKD (chronic kidney disease), stage III     Edema     Hyperlipidemia     Schizophrenia, paranoid type     Venous insufficiency      Past Surgical History:   Procedure Laterality Date    no sx       Family History "   Problem Relation Age of Onset    Blindness Mother     Glaucoma Mother     Heart disease Mother     Edema Mother     Cataracts Father     Heart disease Father     Diabetes Father     Deep vein thrombosis Sister     No Known Problems Brother     No Known Problems Maternal Aunt     No Known Problems Maternal Uncle     No Known Problems Paternal Aunt     No Known Problems Paternal Uncle     No Known Problems Maternal Grandmother     No Known Problems Maternal Grandfather     No Known Problems Paternal Grandmother     No Known Problems Paternal Grandfather     Amblyopia Neg Hx     Macular degeneration Neg Hx     Retinal detachment Neg Hx     Strabismus Neg Hx     Cancer Neg Hx     Hypertension Neg Hx     Stroke Neg Hx     Thyroid disease Neg Hx      Social History     Tobacco Use    Smoking status: Never    Smokeless tobacco: Never   Substance Use Topics    Alcohol use: No    Drug use: No     Review of Systems   Constitutional:  Negative for chills and fever.   HENT:  Negative for congestion and rhinorrhea.    Respiratory:  Negative for chest tightness and shortness of breath.    Cardiovascular:  Positive for leg swelling. Negative for chest pain and palpitations.   Gastrointestinal:  Negative for abdominal pain, diarrhea, nausea and vomiting.   Genitourinary:  Negative for dysuria and flank pain.   Musculoskeletal:  Negative for back pain and neck pain.   Skin:  Negative for color change and wound.   Neurological:  Negative for dizziness, numbness and headaches.       Physical Exam     Initial Vitals [09/20/23 1011]   BP Pulse Resp Temp SpO2   (!) 150/69 71 20 98 °F (36.7 °C) 100 %      MAP       --         Physical Exam    Nursing note and vitals reviewed.  Constitutional: He appears well-developed and well-nourished. He is not diaphoretic. No distress.   HENT:   Head: Normocephalic and atraumatic.   Eyes: Conjunctivae are normal. Pupils are equal, round, and reactive to light.   Neck: Neck supple.   Normal range of  motion.  Cardiovascular:  Normal rate, regular rhythm and normal heart sounds.           Distal pedal pulses noted by Doppler   Pulmonary/Chest: Breath sounds normal. No respiratory distress. He has no wheezes. He has no rales.   Abdominal: Abdomen is soft. Bowel sounds are normal. He exhibits no distension. There is no abdominal tenderness. There is no rebound.   Musculoskeletal:         General: Edema present. No tenderness. Normal range of motion.      Cervical back: Normal range of motion and neck supple.      Comments: Significant edema with evidence of chronic stasis in both lower extremities bilaterally.  No evidence of erythema, or warmth to touch.     Neurological: He is alert and oriented to person, place, and time.   Ambulatory with steady gait.   Skin: Skin is warm and dry. Capillary refill takes less than 2 seconds.   Psychiatric: He has a normal mood and affect.         ED Course   Procedures  Labs Reviewed   COMPREHENSIVE METABOLIC PANEL - Abnormal; Notable for the following components:       Result Value    Potassium 6.0 (*)     eGFR 58 (*)     All other components within normal limits   CBC W/ AUTO DIFFERENTIAL - Abnormal; Notable for the following components:    MCHC 31.5 (*)     All other components within normal limits   PROTIME-INR   ISTAT PROCEDURE          Imaging Results    None          Medications - No data to display  Medical Decision Making  10:36AM:  Patient is a 72-year-old male who presents to the emergency department with an ultrasound concerning for bilateral DVTs.  Patient appears well, nontoxic.  He does have significant leg swelling but is otherwise asymptomatic with no lower extremity pain or chest pain and shortness breath.  Will plan for labs, will continue to follow and reassess.      Differential Diagnosis: Cellulitis, DVT, venous stasis, thrombophlebitis    Amount and/or Complexity of Data Reviewed  Labs: ordered. Decision-making details documented in ED  Course.    Risk  Prescription drug management.      2:31 PM:  Patient doing well, remains stable.  He has no evidence of kidney disease and his coags are within normal limits.  Will plan to start on a course of Xarelto along with the hematology/oncology referral.  I do not feel that further work up in the ED is indicated at this time.  I updated pt regarding results and I counseled pt regarding supportive care measures.  I have discussed with the pt ED return warnings and need for close PCP f/u.  Pt agreeable to plan and all questions answered.  I feel that pt is stable for discharge and management as an outpatient and no further intervention is needed at this time.  Pt is comfortable returning to the ED if needed.  Will DC home in stable condition.            Scribe Attestation:   Scribe #1: I performed the above scribed service and the documentation accurately describes the services I performed. I attest to the accuracy of the note.         Physician Attestation for Scribe: I, Whitley Ayon, reviewed documentation as scribed in my presence, which is both accurate and complete.                Clinical Impression:   Final diagnoses:  [I82.433] Acute deep vein thrombosis (DVT) of popliteal vein of both lower extremities (Primary)        ED Disposition Condition    Discharge Stable          ED Prescriptions       Medication Sig Dispense Start Date End Date Auth. Provider    rivaroxaban (XARELTO) 15 mg Tab Take 1 tablet (15 mg total) by mouth 2 (two) times daily with meals. for 21 days 42 tablet 9/20/2023 10/11/2023 Whitley Ayon MD    rivaroxaban (XARELTO) 20 mg Tab Take 1 tablet (20 mg total) by mouth daily with dinner or evening meal. 42 tablet 10/11/2023 -- Whitley Ayon MD          Follow-up Information       Follow up With Specialties Details Why Contact Info    Dickson Laurent MD Internal Medicine   [address]  [place] [address]  [phone]               Whitley Ayon MD  09/20/23 7497

## 2023-09-20 NOTE — DISCHARGE INSTRUCTIONS
We have provided you with a prescription for blood thinners. Please fill and take as directed.    Please return to the ER if you have chest pain, difficulty breathing, fevers, altered mental status, dizziness, weakness, or any other concerns.      Follow up with your primary care physician.

## 2023-09-20 NOTE — ED NOTES
Estelle from lab called again, states hemolyzed. Informed that will order and put in for lab collection.

## 2023-09-20 NOTE — ED TRIAGE NOTES
Pt reports he had bilateral leg ultrasound done this morning that was positive for DVT. Pt denies hx. Pt reports he doesn't take any meds. Pt denies sob, cp, fever. Pt aaox4. Pt bilateral lower extremities swollen, warm to touch. Weak pedal pulses felt

## 2023-09-20 NOTE — ED NOTES
Called Estelle in lab, informed her that pt is a difficult stick, blood has been sent up and to please run PT-INR ASAP.

## 2023-10-16 ENCOUNTER — OFFICE VISIT (OUTPATIENT)
Dept: INTERNAL MEDICINE | Facility: CLINIC | Age: 73
End: 2023-10-16
Payer: MEDICARE

## 2023-10-16 VITALS
SYSTOLIC BLOOD PRESSURE: 138 MMHG | WEIGHT: 202.81 LBS | BODY MASS INDEX: 31.77 KG/M2 | OXYGEN SATURATION: 100 % | HEART RATE: 63 BPM | DIASTOLIC BLOOD PRESSURE: 78 MMHG

## 2023-10-16 DIAGNOSIS — L27.0: ICD-10-CM

## 2023-10-16 DIAGNOSIS — I89.0 LYMPHEDEMA OF BOTH LOWER EXTREMITIES: ICD-10-CM

## 2023-10-16 DIAGNOSIS — I87.2 VENOUS INSUFFICIENCY: Primary | Chronic | ICD-10-CM

## 2023-10-16 DIAGNOSIS — I87.2 VENOUS STASIS DERMATITIS OF BOTH LOWER EXTREMITIES: ICD-10-CM

## 2023-10-16 PROCEDURE — 99213 OFFICE O/P EST LOW 20 MIN: CPT | Mod: PBBFAC | Performed by: STUDENT IN AN ORGANIZED HEALTH CARE EDUCATION/TRAINING PROGRAM

## 2023-10-16 PROCEDURE — 99999 PR PBB SHADOW E&M-EST. PATIENT-LVL III: ICD-10-PCS | Mod: PBBFAC,,, | Performed by: STUDENT IN AN ORGANIZED HEALTH CARE EDUCATION/TRAINING PROGRAM

## 2023-10-16 PROCEDURE — 99213 OFFICE O/P EST LOW 20 MIN: CPT | Mod: S$PBB,,, | Performed by: STUDENT IN AN ORGANIZED HEALTH CARE EDUCATION/TRAINING PROGRAM

## 2023-10-16 PROCEDURE — 99999 PR PBB SHADOW E&M-EST. PATIENT-LVL III: CPT | Mod: PBBFAC,,, | Performed by: STUDENT IN AN ORGANIZED HEALTH CARE EDUCATION/TRAINING PROGRAM

## 2023-10-16 PROCEDURE — 99213 PR OFFICE/OUTPT VISIT, EST, LEVL III, 20-29 MIN: ICD-10-PCS | Mod: S$PBB,,, | Performed by: STUDENT IN AN ORGANIZED HEALTH CARE EDUCATION/TRAINING PROGRAM

## 2023-10-16 NOTE — PROGRESS NOTES
"[facility]    Subjective:     Patient ID: Oral Gonzales is a 73 y.o. male.  Chief Complaint: Leg Pain (Left leg itching and "leaking" x 3 days.)    HPI:  Patient is a 73 y.o. male who   has a past medical history of Arthritis, CKD (chronic kidney disease), stage III, Edema, Hyperlipidemia, Schizophrenia, paranoid type, and Venous insufficiency.  who presents for  itchy rash on his bilateral upper thighs.  States the rash has been there for several days.  He is wondering if it isn't infection such as shingles.  Patient has a known history of lower extremity venous insufficiency and venous stasis dermatitis.  He has been seen in the past by Dermatology and vascular surgery.  Last encounter was in the ED where he was started on Xarelto.  Patient states he has since run out of this medicine.  Patient had previously been given triamcinolone cream.  He states he has been using this but it has not helped.  He denies shortness of breath or chest pain.    Current Outpatient Medications   Medication Instructions    predniSONE (DELTASONE) 20 MG tablet 3 tablets po daily for 3 days, then 2 tablets po daily for 3 days, then 1 tablets po daily for 3 days    triamcinolone acetonide 0.1% (KENALOG) 0.1 % cream Topical (Top), 2 times daily, Use to affected areas for up to 2 weeks to legs    triamcinolone acetonide 0.1% (KENALOG) 0.1 % ointment 1 application , Topical (Top), 2 times daily    XARELTO 20 mg, Oral, With dinner       Objective:        Body mass index is 31.77 kg/m².  Vitals:    10/16/23 0811   BP: 138/78   Pulse: 63   SpO2: 100%   Weight: 92 kg (202 lb 13.2 oz)   PainSc:   8     Physical Exam  Cardiovascular:      Rate and Rhythm: Normal rate.      Heart sounds: No murmur heard.  Pulmonary:      Effort: No respiratory distress.      Breath sounds: No wheezing or rales.   Abdominal:      General: Abdomen is flat.      Palpations: Abdomen is soft.   Musculoskeletal:      Right lower leg: Edema " present.      Left lower leg: Edema present.   Neurological:      Mental Status: He is alert.                   Lab Results   Component Value Date    WBC 5.39 09/20/2023    HGB 15.0 09/20/2023    HCT 47.6 09/20/2023     09/20/2023    CHOL 230 (H) 08/29/2019    TRIG 159 (H) 08/29/2019    HDL 42 08/29/2019    ALT 27 09/20/2023    AST 35 09/20/2023     09/20/2023    K 6.0 (H) 09/20/2023     09/20/2023    CREATININE 1.3 09/20/2023    BUN 14 09/20/2023    CO2 24 09/20/2023    TSH 2.314 05/31/2023    PSA 2.4 03/20/2018    INR 0.9 09/20/2023    HGBA1C 5.4 12/14/2018     Assessment:         1. Venous insufficiency    2. Venous stasis dermatitis of both lower extremities    3. Lymphedema of both lower extremities    4. Drug-induced dermatosis          Plan:   1. Venous insufficiency  2. Venous stasis dermatitis of both lower extremities  3. Lymphedema of both lower extremities  Refilled patient's Xarelto today.  Advised him to continue taking this to promote resolution of lower extremity DVTs and adequate blood flow.  Recent CMP reviewed and is notable for creatinine clearance greater than 50.  Patient has no history of bleeding disorders or falls.  Signed patient up for MyChart in the room today to facilitate coordination of care and to give patient access to list of upcoming appointments.  Advised patient to return to PCP and Dermatology as well as vascular surgery for further management.  Advised continued uses triamcinolone cream which patient has not adequate supply of.    All of your core healthy metrics are met.    No follow-ups on file. or sooner prn (as needed)        Ravinder Duncan  [facility]  [address] [address]  Phone [phone]  Fax [phone]      This note is dictated using the M*Modal Fluency Direct word recognition program. It may contain word recognition mistakes or wrong word substitutions that were missed on review.

## 2023-10-17 NOTE — PROGRESS NOTES
MEDICAL HISTORY:  Hypertension.  Lymphedema of both legs.  Venous thromboembolism both lower extremities   Chronic venous insufficiency  Schizophrenia  Chronic kidney disease stage 3  Statin-induced myalgias.     SOCIAL HISTORY:  Tobacco use, none.  Alcohol use, none      73-year-old male   Follow-up visit     He was seen in the emergency room September 20th with early in the day had a venous Doppler.  It showed nonocclusive thrombus involving both popliteal veins in the right superficial femoral.  Xarelto was initiated.    He followed up with internal medicine yesterday regarding venous stasis dermatitis and venous insufficiency.  Was recommend just continue with the use of triamcinolone    He notes that his legs are still swollen that the skin is red as well as pruritic.  There is no report of chest pain, shortness breath or abdominal pain.  Regular bowel function.  Urination can be frequent    He brings with him to medications when his Xarelto 20 mg a day which is appropriate.  But he has a bottle of prednisone at 10 mg that was prescribed in mid May in regard to the dermatitis.  He also had received a prednisone 20 mg in late May with instructions to take over 9 days.  He started the medication last week.  I am not certain why this is the case but but the pills a coming from a bottle that was prescribed in May.  Not certain if the prednisone is going to be attend 20 mg he states that he is on the 3rd day of 3 tablets a day.      Examination   Weight 199 lb   Pulse 80   Blood pressure 130/72   BMI 31.25  Chest clear breath sounds  Heart regular rate rhythm, no murmurs gallops  Abdominal exam nontender soft no hepatosplenomegaly abdominal masses    2+ femoral pulses bilateral   2+ right dorsalis pedis, 1+ left dorsalis pedis pulse    Both lower extremities there is areas of hyperemia with scab scale skin on the anterior thigh as well as anterior lateral medial aspect of the distal leg with hyperemia.  2+ edema  involving the right lower extremities 2 to 3+ edema of the left lower extremity    Impression  Venous thromboembolism  Chronic venous insufficiency   Chronic venous stasis dermatitis and dermatitis  Chronic kidney disease stage IIIA    Plan  Repeat renal function panel today.  When he was in emergency room potassium was 6.0.  After was the size on use of a diuretic or fluid pill  Repeat venous Doppler in vascular lab to evaluate for the venous insufficiency  Regular walking routine discussed    Although he has a lot of pills of the prednisone left recommend that he just takes 2 for 3 days then 1 for 3 days to taper off

## 2023-10-18 ENCOUNTER — OFFICE VISIT (OUTPATIENT)
Dept: INTERNAL MEDICINE | Facility: CLINIC | Age: 73
End: 2023-10-18
Payer: MEDICARE

## 2023-10-18 ENCOUNTER — LAB VISIT (OUTPATIENT)
Dept: LAB | Facility: HOSPITAL | Age: 73
End: 2023-10-18
Attending: INTERNAL MEDICINE
Payer: MEDICARE

## 2023-10-18 ENCOUNTER — PATIENT MESSAGE (OUTPATIENT)
Dept: INTERNAL MEDICINE | Facility: CLINIC | Age: 73
End: 2023-10-18

## 2023-10-18 VITALS
DIASTOLIC BLOOD PRESSURE: 70 MMHG | WEIGHT: 199.5 LBS | OXYGEN SATURATION: 100 % | SYSTOLIC BLOOD PRESSURE: 130 MMHG | BODY MASS INDEX: 31.25 KG/M2 | HEART RATE: 93 BPM

## 2023-10-18 DIAGNOSIS — N18.31 STAGE 3A CHRONIC KIDNEY DISEASE: ICD-10-CM

## 2023-10-18 DIAGNOSIS — I82.90 VENOUS THROMBOEMBOLISM: ICD-10-CM

## 2023-10-18 DIAGNOSIS — I87.2 VENOUS STASIS DERMATITIS OF BOTH LOWER EXTREMITIES: ICD-10-CM

## 2023-10-18 DIAGNOSIS — I87.2 CHRONIC VENOUS INSUFFICIENCY: ICD-10-CM

## 2023-10-18 DIAGNOSIS — I87.2 VENOUS STASIS DERMATITIS OF BOTH LOWER EXTREMITIES: Primary | ICD-10-CM

## 2023-10-18 LAB
ALBUMIN SERPL BCP-MCNC: 4.2 G/DL (ref 3.5–5.2)
ANION GAP SERPL CALC-SCNC: 10 MMOL/L (ref 8–16)
BUN SERPL-MCNC: 24 MG/DL (ref 8–23)
CALCIUM SERPL-MCNC: 10.3 MG/DL (ref 8.7–10.5)
CHLORIDE SERPL-SCNC: 108 MMOL/L (ref 95–110)
CO2 SERPL-SCNC: 23 MMOL/L (ref 23–29)
CREAT SERPL-MCNC: 1.5 MG/DL (ref 0.5–1.4)
EST. GFR  (NO RACE VARIABLE): 48.9 ML/MIN/1.73 M^2
GLUCOSE SERPL-MCNC: 123 MG/DL (ref 70–110)
PHOSPHATE SERPL-MCNC: 1.8 MG/DL (ref 2.7–4.5)
POTASSIUM SERPL-SCNC: 4.7 MMOL/L (ref 3.5–5.1)
SODIUM SERPL-SCNC: 141 MMOL/L (ref 136–145)

## 2023-10-18 PROCEDURE — 80069 RENAL FUNCTION PANEL: CPT | Performed by: INTERNAL MEDICINE

## 2023-10-18 PROCEDURE — 99999 PR PBB SHADOW E&M-EST. PATIENT-LVL III: CPT | Mod: PBBFAC,,, | Performed by: INTERNAL MEDICINE

## 2023-10-18 PROCEDURE — 99214 PR OFFICE/OUTPT VISIT, EST, LEVL IV, 30-39 MIN: ICD-10-PCS | Mod: S$PBB,,, | Performed by: INTERNAL MEDICINE

## 2023-10-18 PROCEDURE — 99214 OFFICE O/P EST MOD 30 MIN: CPT | Mod: S$PBB,,, | Performed by: INTERNAL MEDICINE

## 2023-10-18 PROCEDURE — 99213 OFFICE O/P EST LOW 20 MIN: CPT | Mod: PBBFAC | Performed by: INTERNAL MEDICINE

## 2023-10-18 PROCEDURE — 36415 COLL VENOUS BLD VENIPUNCTURE: CPT | Performed by: INTERNAL MEDICINE

## 2023-10-18 PROCEDURE — 99999 PR PBB SHADOW E&M-EST. PATIENT-LVL III: ICD-10-PCS | Mod: PBBFAC,,, | Performed by: INTERNAL MEDICINE

## 2023-10-18 RX ORDER — DESONIDE 0.5 MG/ML
LOTION TOPICAL 2 TIMES DAILY
Qty: 120 ML | Refills: 0 | Status: SHIPPED | OUTPATIENT
Start: 2023-10-18

## 2023-10-18 RX ORDER — FUROSEMIDE 20 MG/1
20 TABLET ORAL DAILY
Qty: 7 TABLET | Refills: 0 | Status: SHIPPED | OUTPATIENT
Start: 2023-10-18 | End: 2023-11-07

## 2023-10-18 NOTE — PROGRESS NOTES
Test results noted in reviewed with the patient     Will initiate Lasix 20 mg once a day for the next 7 days, he will let me know how he responds

## 2023-10-26 ENCOUNTER — OFFICE VISIT (OUTPATIENT)
Dept: OPTOMETRY | Facility: CLINIC | Age: 73
End: 2023-10-26
Payer: MEDICARE

## 2023-10-26 DIAGNOSIS — Z13.5 GLAUCOMA SCREENING: ICD-10-CM

## 2023-10-26 DIAGNOSIS — H43.811 SYMPTOMATIC POSTERIOR VITREOUS DETACHMENT OF RIGHT EYE: Primary | ICD-10-CM

## 2023-10-26 DIAGNOSIS — H52.223 REGULAR ASTIGMATISM OF BOTH EYES: ICD-10-CM

## 2023-10-26 DIAGNOSIS — Z96.1 PSEUDOPHAKIA OF BOTH EYES: ICD-10-CM

## 2023-10-26 PROCEDURE — 99212 OFFICE O/P EST SF 10 MIN: CPT | Mod: PBBFAC | Performed by: OPTOMETRIST

## 2023-10-26 PROCEDURE — 99999 PR PBB SHADOW E&M-EST. PATIENT-LVL II: CPT | Mod: PBBFAC,,, | Performed by: OPTOMETRIST

## 2023-10-26 PROCEDURE — 92015 DETERMINE REFRACTIVE STATE: CPT | Mod: ,,, | Performed by: OPTOMETRIST

## 2023-10-26 PROCEDURE — 99999 PR PBB SHADOW E&M-EST. PATIENT-LVL II: ICD-10-PCS | Mod: PBBFAC,,, | Performed by: OPTOMETRIST

## 2023-10-26 PROCEDURE — 92004 COMPRE OPH EXAM NEW PT 1/>: CPT | Mod: S$PBB,,, | Performed by: OPTOMETRIST

## 2023-10-26 PROCEDURE — 92015 PR REFRACTION: ICD-10-PCS | Mod: ,,, | Performed by: OPTOMETRIST

## 2023-10-26 PROCEDURE — 92004 PR EYE EXAM, NEW PATIENT,COMPREHESV: ICD-10-PCS | Mod: S$PBB,,, | Performed by: OPTOMETRIST

## 2023-10-26 NOTE — PROGRESS NOTES
HPI    Annual Exam   Pt states vision is blurry @ Near   OTC Unsure of what power     Pt reports floaters OD long standing   Pt denies Dry/ Itchy/ Burning  Gtt: No    HX of Iritis   Pt lost follow up c Dr Lr   Pt does not remember finishing treatments of Pred   Last edited by Mary Steiner on 10/26/2023  8:32 AM.            Assessment /Plan     For exam results, see Encounter Report.    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

## 2023-11-07 ENCOUNTER — NURSE TRIAGE (OUTPATIENT)
Dept: ADMINISTRATIVE | Facility: CLINIC | Age: 73
End: 2023-11-07
Payer: MEDICARE

## 2023-11-07 ENCOUNTER — OFFICE VISIT (OUTPATIENT)
Dept: INTERNAL MEDICINE | Facility: CLINIC | Age: 73
End: 2023-11-07
Payer: MEDICARE

## 2023-11-07 ENCOUNTER — LAB VISIT (OUTPATIENT)
Dept: LAB | Facility: HOSPITAL | Age: 73
End: 2023-11-07
Attending: INTERNAL MEDICINE
Payer: MEDICARE

## 2023-11-07 VITALS
BODY MASS INDEX: 31.31 KG/M2 | WEIGHT: 199.5 LBS | DIASTOLIC BLOOD PRESSURE: 68 MMHG | HEART RATE: 87 BPM | SYSTOLIC BLOOD PRESSURE: 128 MMHG | OXYGEN SATURATION: 98 % | HEIGHT: 67 IN

## 2023-11-07 DIAGNOSIS — I87.2 VENOUS STASIS DERMATITIS OF BOTH LOWER EXTREMITIES: ICD-10-CM

## 2023-11-07 DIAGNOSIS — I82.90 VENOUS THROMBOEMBOLISM: ICD-10-CM

## 2023-11-07 DIAGNOSIS — N18.31 STAGE 3A CHRONIC KIDNEY DISEASE: ICD-10-CM

## 2023-11-07 DIAGNOSIS — I89.0 LYMPHEDEMA OF BOTH LOWER EXTREMITIES: ICD-10-CM

## 2023-11-07 DIAGNOSIS — I87.2 CHRONIC VENOUS INSUFFICIENCY: ICD-10-CM

## 2023-11-07 DIAGNOSIS — L03.116 BILATERAL LOWER LEG CELLULITIS: ICD-10-CM

## 2023-11-07 DIAGNOSIS — L03.115 BILATERAL LOWER LEG CELLULITIS: ICD-10-CM

## 2023-11-07 DIAGNOSIS — I82.90 VENOUS THROMBOEMBOLISM: Primary | ICD-10-CM

## 2023-11-07 LAB
ALBUMIN SERPL BCP-MCNC: 4 G/DL (ref 3.5–5.2)
ANION GAP SERPL CALC-SCNC: 10 MMOL/L (ref 8–16)
BUN SERPL-MCNC: 17 MG/DL (ref 8–23)
CALCIUM SERPL-MCNC: 9.6 MG/DL (ref 8.7–10.5)
CHLORIDE SERPL-SCNC: 106 MMOL/L (ref 95–110)
CO2 SERPL-SCNC: 27 MMOL/L (ref 23–29)
CREAT SERPL-MCNC: 1.4 MG/DL (ref 0.5–1.4)
EST. GFR  (NO RACE VARIABLE): 53.1 ML/MIN/1.73 M^2
GLUCOSE SERPL-MCNC: 94 MG/DL (ref 70–110)
PHOSPHATE SERPL-MCNC: 3.6 MG/DL (ref 2.7–4.5)
POTASSIUM SERPL-SCNC: 4.3 MMOL/L (ref 3.5–5.1)
SODIUM SERPL-SCNC: 143 MMOL/L (ref 136–145)

## 2023-11-07 PROCEDURE — 99214 PR OFFICE/OUTPT VISIT, EST, LEVL IV, 30-39 MIN: ICD-10-PCS | Mod: S$PBB,,, | Performed by: INTERNAL MEDICINE

## 2023-11-07 PROCEDURE — 99999 PR PBB SHADOW E&M-EST. PATIENT-LVL III: ICD-10-PCS | Mod: PBBFAC,,, | Performed by: INTERNAL MEDICINE

## 2023-11-07 PROCEDURE — 80069 RENAL FUNCTION PANEL: CPT | Performed by: INTERNAL MEDICINE

## 2023-11-07 PROCEDURE — 99213 OFFICE O/P EST LOW 20 MIN: CPT | Mod: PBBFAC | Performed by: INTERNAL MEDICINE

## 2023-11-07 PROCEDURE — 36415 COLL VENOUS BLD VENIPUNCTURE: CPT | Performed by: INTERNAL MEDICINE

## 2023-11-07 PROCEDURE — 99214 OFFICE O/P EST MOD 30 MIN: CPT | Mod: S$PBB,,, | Performed by: INTERNAL MEDICINE

## 2023-11-07 PROCEDURE — 99999 PR PBB SHADOW E&M-EST. PATIENT-LVL III: CPT | Mod: PBBFAC,,, | Performed by: INTERNAL MEDICINE

## 2023-11-07 RX ORDER — BUMETANIDE 2 MG/1
2 TABLET ORAL DAILY
Qty: 10 TABLET | Refills: 0 | Status: SHIPPED | OUTPATIENT
Start: 2023-11-07 | End: 2023-11-17

## 2023-11-07 RX ORDER — CLINDAMYCIN HYDROCHLORIDE 150 MG/1
150 CAPSULE ORAL 3 TIMES DAILY
Qty: 30 CAPSULE | Refills: 0 | Status: SHIPPED | OUTPATIENT
Start: 2023-11-07 | End: 2023-11-17

## 2023-11-07 NOTE — PROGRESS NOTES
MEDICAL HISTORY:  Hypertension.  Lymphedema of both legs.  Venous thromboembolism both lower extremities   Chronic venous insufficiency  Schizophrenia  Chronic kidney disease stage 3  Statin-induced myalgias.     SOCIAL HISTORY:  Tobacco use, none.  Alcohol use, none    73-year-old male  Presents with ongoing swelling involving both lower extremities but the main symptoms is diffuse erythematous scaly rash involving the distal lower extremity.  It itches a times is painful and recently that has been drainage.  Early in the year he met with Dermatology where biopsy showed acute erythematous pustulosis.  In the past he received a 5 day course of cephalexin in his been on courses of prednisone.  Early in the year he was also given a course of furosemide.  He also has not erythematous rash on his forearm.    Reports no fever chills.  No chest pain shortness breast abdominal pain.    He is had a venous Doppler which noted DVT involving the left superficial femoral and popliteal vein and right popliteal vein    Medications   Xarelto 20 mg  DesOwen lotion which was given on last visit    Examination   Weight 199 lb   BMI 31.25   Pulse 88   Blood pressure 120 over 72  Chest clear breath sounds  Heart regular rate and rhythm   Abdominal exam is bowel sounds soft nontender no masses    Lower extremities 2+ edema involving both legs up to the thigh.  The skin is diffusely erythematous in his multiple breaks in his skin along the way particularly the thigh.  There is clear drainage coming from the breaks in his thigh.    Impression  Venous thromboembolism   Lower extremity edema secondary venous thromboembolism in lymphedema   Chronic kidney disease stage IIIA   Acute generalized erythematous pustulosis based on biopsy possible associated cellulitis    Plan   Repeat renal function panel  Clindamycin 150 mg 3 times a day for 10 days and Bumex 2 mg once a day for 10 days he will keep me informed of progress.  But if there is no  improvement referral back to Dermatology

## 2023-11-07 NOTE — TELEPHONE ENCOUNTER
Patient states c/o localized itching,  burning rash/boil noted on his legs. Patient states that drainage is sticky, pus-like,  and malodorous.  Patient states prior treatment for rash that included a topical antibiotic creme. Patient states symptoms have worsened since treatment.     Patient advised to See PCP today for evaluation/treatment. Triage RN scheduled a Same Day appointment for patient for today, 11/07/23 @ 1:00 PM with Dr. Dickson Laurent. Patient states understanding of care advice and date/time/location of appointment.       Reason for Disposition   Looks like a boil, infected sore, deep ulcer, or other infected rash (spreading redness, pus)    Additional Information   Negative: Sounds like a life-threatening emergency to the triager   Negative: Athlete's Foot suspected (i.e., itchy rash between the toes)   Negative: Insect bite(s) suspected   Negative: Jock Itch suspected (i.e., itchy rash on inner thighs near genital area)   Negative: Localized lump (or swelling) without redness or rash   Negative: MPOX SUSPECTED (e.g., direct skin contact such as sex, recent travel to [place] or [place]) and any SYMPTOMS OF MPOX (e.g., rash, fever, muscle aches, or swollen lymph nodes)   Negative: At risk for Mpox (men-who-have-sex-with-men) and possible exposure (e.g., multiple sex partners in past 21 days) and ANY SYMPTOMS OF MPOX (e.g., rash, fever, muscle aches, or swollen lymph nodes)   Negative: Poison ivy, oak, or sumac rash suspected (e.g., itchy rash after contact with poison ivy)   Negative: Rash of female genital area (e.g., labia, vagina, vulva)   Negative: Rash of male genital area (e.g., penis, scrotum)   Negative: Redness of immunization site   Negative: Shingles suspected (i.e., painful rash, multiple small blisters grouped together in one area of body; dermatomal distribution)   Negative: Small spot, skin growth, or mole   Negative: Wound infection suspected (i.e., pain, spreading redness, or pus;  in a cut, puncture, scrape or sutured wound)   Negative: Fever and localized purple or blood-colored spots or dots that are not from injury or friction   Negative: Fever and localized rash is very painful   Negative: Patient sounds very sick or weak to the triager    Protocols used: Rash or Redness - [id number]

## 2023-11-08 ENCOUNTER — PATIENT MESSAGE (OUTPATIENT)
Dept: INTERNAL MEDICINE | Facility: CLINIC | Age: 73
End: 2023-11-08
Payer: MEDICARE

## 2023-11-08 NOTE — PROGRESS NOTES
The renal function panel looks better from the prior 1.  To continue proper hydration and to proceed with the instructions given from the visit

## 2024-02-27 DIAGNOSIS — Z00.00 ENCOUNTER FOR MEDICARE ANNUAL WELLNESS EXAM: ICD-10-CM

## 2024-06-10 ENCOUNTER — PATIENT MESSAGE (OUTPATIENT)
Dept: INTERNAL MEDICINE | Facility: CLINIC | Age: 74
End: 2024-06-10
Payer: MEDICARE

## 2024-06-18 ENCOUNTER — OFFICE VISIT (OUTPATIENT)
Dept: INTERNAL MEDICINE | Facility: CLINIC | Age: 74
End: 2024-06-18
Payer: MEDICARE

## 2024-06-18 ENCOUNTER — LAB VISIT (OUTPATIENT)
Dept: LAB | Facility: HOSPITAL | Age: 74
End: 2024-06-18
Payer: MEDICARE

## 2024-06-18 VITALS
HEIGHT: 67 IN | BODY MASS INDEX: 31.49 KG/M2 | OXYGEN SATURATION: 97 % | HEART RATE: 74 BPM | WEIGHT: 200.63 LBS | SYSTOLIC BLOOD PRESSURE: 122 MMHG | DIASTOLIC BLOOD PRESSURE: 68 MMHG

## 2024-06-18 DIAGNOSIS — Z00.00 ENCOUNTER FOR MEDICARE ANNUAL WELLNESS EXAM: Primary | ICD-10-CM

## 2024-06-18 DIAGNOSIS — N18.31 STAGE 3A CHRONIC KIDNEY DISEASE: ICD-10-CM

## 2024-06-18 DIAGNOSIS — F20.0 PARANOID SCHIZOPHRENIA: ICD-10-CM

## 2024-06-18 DIAGNOSIS — I87.2 VENOUS INSUFFICIENCY: Chronic | ICD-10-CM

## 2024-06-18 DIAGNOSIS — Z86.718 HISTORY OF DVT (DEEP VEIN THROMBOSIS): ICD-10-CM

## 2024-06-18 DIAGNOSIS — E78.5 HYPERLIPIDEMIA, UNSPECIFIED HYPERLIPIDEMIA TYPE: Chronic | ICD-10-CM

## 2024-06-18 PROBLEM — E87.5 HYPERKALEMIA: Status: RESOLVED | Noted: 2018-12-14 | Resolved: 2024-06-18

## 2024-06-18 PROBLEM — F23 ACUTE PSYCHOSIS: Status: RESOLVED | Noted: 2018-12-14 | Resolved: 2024-06-18

## 2024-06-18 LAB
ALBUMIN/CREAT UR: 10.1 UG/MG (ref 0–30)
CREAT UR-MCNC: 139 MG/DL (ref 23–375)
MICROALBUMIN UR DL<=1MG/L-MCNC: 14 UG/ML

## 2024-06-18 PROCEDURE — 99214 OFFICE O/P EST MOD 30 MIN: CPT | Mod: PBBFAC | Performed by: PHYSICIAN ASSISTANT

## 2024-06-18 PROCEDURE — 82570 ASSAY OF URINE CREATININE: CPT | Performed by: PHYSICIAN ASSISTANT

## 2024-06-18 PROCEDURE — 82043 UR ALBUMIN QUANTITATIVE: CPT | Performed by: PHYSICIAN ASSISTANT

## 2024-06-18 PROCEDURE — 99999 PR PBB SHADOW E&M-EST. PATIENT-LVL IV: CPT | Mod: PBBFAC,,, | Performed by: PHYSICIAN ASSISTANT

## 2024-06-18 PROCEDURE — G0439 PPPS, SUBSEQ VISIT: HCPCS | Mod: ,,, | Performed by: PHYSICIAN ASSISTANT

## 2024-06-18 NOTE — PATIENT INSTRUCTIONS
Counseling and Referral of Other Preventative  (Italic type indicates deductible and co-insurance are waived)    Patient Name: Oral Gonzales  Today's Date: 6/18/2024    Health Maintenance       Date Due Completion Date    Annual UACr Never done ---    RSV Vaccine (Age 60+ and Pregnant patients) (1 - 1-dose 60+ series) 06/18/2024 (Originally 7/20/2010) ---    TETANUS VACCINE 06/18/2025 (Originally 10/22/2022) 10/22/2012    COVID-19 Vaccine (1 - 2023-24 season) 06/18/2025 (Originally 9/1/2023) ---    Pneumococcal Vaccines (Age 65+) (1 of 2 - PCV) 06/18/2025 (Originally 7/20/1956) ---    Lipid Panel 08/29/2024 8/29/2019    Influenza Vaccine (Season Ended) 09/01/2024 ---    Colorectal Cancer Screening 12/29/2024 12/29/2014        Orders Placed This Encounter   Procedures    Microalbumin/Creatinine Ratio, Urine       The following information is provided to all patients.  This information is to help you find resources for any of the problems found today that may be affecting your health:                  Living healthy guide: www.[facility].[place].gov      Understanding Diabetes: www.diabetes.org      Eating healthy: www.cdc.gov/healthyweight      CDC home safety checklist: www.cdc.gov/steadi/patient.html      Agency on Aging: www.goea.[place].[facility]      Alcoholics anonymous (AA): www.aa.org      Physical Activity: www.maximiliano.nih.gov/ob9jmyz      Tobacco use: www.quitwithusla.org

## 2024-06-18 NOTE — PROGRESS NOTES
I offered to discuss advanced care planning, including how to pick a person who would make decisions for you if you were unable to make them for yourself, called a health care power of , and what kind of decisions you might make such as use of life sustaining treatments such as ventilators and tube feeding when faced with a life limiting illness recorded on a living will that they will need to know. (How you want to be cared for as you near the end of your natural life)     X  Patient is unable to engage in a discussion regarding advanced directives due to schizophrenia/mild cognitive impairment.

## 2024-06-18 NOTE — PROGRESS NOTES
"  Oral Gonzales presented for an initial Medicare AWV today. The following components were reviewed and updated:    Medical history  Family History  Social history  Allergies and Current Medications  Health Risk Assessment  Health Maintenance  Care Team    **See Completed Assessments for Annual Wellness visit with in the encounter summary    The following assessments were completed:  Depression Screening  Cognitive function Screening    Timed Get Up Test  Whisper Test      Opioid documentation:      Patient does not have a current opioid prescription.          Vitals:    06/18/24 1037   BP: 122/68   BP Location: Right arm   Patient Position: Sitting   BP Method: Medium (Manual)   Pulse: 74   SpO2: 97%   Weight: 91 kg (200 lb 9.9 oz)   Height: 5' 7" (1.702 m)     Body mass index is 31.42 kg/m².       Physical Exam  Constitutional:       General: He is not in acute distress.     Appearance: He is not ill-appearing.   HENT:      Right Ear: Tympanic membrane, ear canal and external ear normal.      Left Ear: Tympanic membrane, ear canal and external ear normal.   Cardiovascular:      Rate and Rhythm: Normal rate and regular rhythm.   Pulmonary:      Effort: Pulmonary effort is normal. No respiratory distress.      Breath sounds: No wheezing.   Musculoskeletal:      Right lower leg: Edema present.      Left lower leg: Edema present.   Skin:     Findings: No rash.   Neurological:      Mental Status: He is alert.   Psychiatric:         Mood and Affect: Mood normal.           Diagnoses and health risks identified today and associated recommendations/orders:  1. Encounter for Medicare annual wellness exam  Exam and Assessments performed  Chart Review Complete  Health Maintenance Reviewed and updated  - Ambulatory Referral/Consult to Enhanced Annual Wellness Visit (eAWV)    2. Stage 3a chronic kidney disease  Stable   Followed by PCP  - Microalbumin/Creatinine Ratio, Urine; Future    3. Paranoid schizophrenia  Stable  Not on " natan at this time  Followed by PCP    4. Venous insufficiency  Stable  Advised on leg elevation, compression stocking.  Followed by PCP    5. Hyperlipidemia, unspecified hyperlipidemia type  Stable  Lifestyle mods reviewed  Followed by PCP    6. History of DVT (deep vein thrombosis)  Discussed compliance with xarelto  Followed by PCP      Provided Oral with a 5-10 year written screening schedule and personal prevention plan. Recommendations were developed using the USPSTF age appropriate recommendations. Education, counseling, and referrals were provided as needed.  After Visit Summary printed and given to patient which includes a list of additional screenings\tests needed.    Follow up in about 5 months (around 11/18/2024) for PCP follow up and 1 year for next Medicare AWV.      Salma Estrada PA-C

## 2024-07-02 ENCOUNTER — TELEPHONE (OUTPATIENT)
Dept: INTERNAL MEDICINE | Facility: CLINIC | Age: 74
End: 2024-07-02
Payer: MEDICARE

## 2024-07-02 NOTE — TELEPHONE ENCOUNTER
LVM for pt daughter to give the office a call back to let us know if he's still going to make it to his 10 am appointment.

## 2024-07-12 ENCOUNTER — TELEPHONE (OUTPATIENT)
Dept: INTERNAL MEDICINE | Facility: CLINIC | Age: 74
End: 2024-07-12
Payer: MEDICARE

## 2024-07-12 NOTE — TELEPHONE ENCOUNTER
Roxy from [facility] is calling to say they received a home health order from the hospital and would like to discuss if the provider and staff will follow the directions on the home health orders.     Please advise on if you will follow pts care while on Home Health.

## 2024-07-12 NOTE — TELEPHONE ENCOUNTER
----- Message from Linnette Shelton sent at 7/12/2024  1:43 PM CDT -----  Contact: Roxy @ [facility] [phone]  Would like to receive medical advice.    Would they like a call back or a response via [facility]:  call back    Additional information:  Roxy from  [facility] is calling to say they received a home health order from the hospital and would like to discuss if the provider and staff will follow the directions on the home health orders. Please call Roxy back for advice

## 2024-07-16 ENCOUNTER — PATIENT OUTREACH (OUTPATIENT)
Dept: ADMINISTRATIVE | Facility: CLINIC | Age: 74
End: 2024-07-16
Payer: MEDICARE

## 2024-08-21 NOTE — PROGRESS NOTES
MEDICAL HISTORY:  Hypertension.  Hyperlipidemia  Lymphedema of both legs.  Venous thromboembolism both lower extremities  Chronic venous insufficiency  Schizophrenia  Chronic kidney disease stage 3  Statin-induced myalgias.     SOCIAL HISTORY:  Tobacco use, none.  Alcohol use, none    Medications   Xarelto 20 mg      74-year-old male   Presents for routine visit    It was noted that late June he presented to [facility] because of the acute venous thromboembolism left leg as well as what appeared to be psychotic behavior diagnosed with paranoid schizophrenia.  He was treated with heparin Lovenox and discharged on Xarelto.  Ultrasound showed a significant thrombus in his left femoral and left popliteal vein.  He was later transferred to psychiatric hospital it appears initially he was placed on Risperdal then switched to Zyprexa.    In going over his medicines presently he knows that he is taking Xarelto 20 mg.  He states that he is taking another medicine twice a day which I think could either be risperidone or Zyprexa.    With his hospital stay there was evidence for rhabdomyolysis with elevated CPK acute kidney injury on top of chronic kidney disease stage IIIA receiving IV fluids.    He has a history of hypertension and hyperlipidemia for which 1 time he was on statins.    Presently reports no chest pain, palpitations, shortness for breath, abdominal pain.  He was regular bowel function.  That has no difficulty with urinating.  No incontinence.  He still reports some degree of swelling involving the legs.  No arthralgias.  Lives by himself.  When inquired if he was being followed by psychiatrist he states that he does not know    It was noted that last year September was seen emergency room for leg swelling.  Ultrasound did show thrombus in the right perineal and thrombus in his left femoral and popliteal.  Prescription for Xarelto was given but present he was not aware of this    Examination   Weight 199 lb   BMI 31.21    Pulse 80   Blood pressure 112/62   HEENT exam, no abnormal findings   Neck, no thyromegaly   Chest, clear breath sounds   Heart, regular rate rhythm no murmurs   Abdominal exam, soft nontender no hepatosplenomegaly abdominal masses  Pulses, 2+ carotid pulses no bruits trace dorsalis pedal pulses  Extremities 3+ edema, mi hyperemia  Skin, no other abnormal findings   Digital rectal exam stool is brown, prostate is moderately enlarged  Musculoskeletal no major joint deformity    Impression   Venous thromboembolism with bilateral DVTs of the lower extremity  Chronic venous insufficiency with lymphedema both legs  Chronic kidney disease stage 3 a  Schizophrenic disorder  BPH  Hyperlipidemia  Elevated CPK    Plan  Routine labs including PSA CPK in labs to evaluate for thrombophilic disorder   Continue with Xarelto the rationale was discussed   Need to know the name in prescribed it physician of the other medication  Follow-up vascular Doppler

## 2024-08-22 ENCOUNTER — LAB VISIT (OUTPATIENT)
Dept: LAB | Facility: HOSPITAL | Age: 74
End: 2024-08-22
Attending: INTERNAL MEDICINE
Payer: MEDICARE

## 2024-08-22 ENCOUNTER — OFFICE VISIT (OUTPATIENT)
Dept: INTERNAL MEDICINE | Facility: CLINIC | Age: 74
End: 2024-08-22
Payer: MEDICARE

## 2024-08-22 VITALS
DIASTOLIC BLOOD PRESSURE: 60 MMHG | WEIGHT: 199.31 LBS | HEIGHT: 67 IN | OXYGEN SATURATION: 99 % | SYSTOLIC BLOOD PRESSURE: 110 MMHG | BODY MASS INDEX: 31.28 KG/M2 | HEART RATE: 84 BPM

## 2024-08-22 DIAGNOSIS — I82.90 VENOUS THROMBOEMBOLISM (VTE): Primary | ICD-10-CM

## 2024-08-22 DIAGNOSIS — I82.513 CHRONIC DEEP VEIN THROMBOSIS (DVT) OF FEMORAL VEIN OF BOTH LOWER EXTREMITIES: ICD-10-CM

## 2024-08-22 DIAGNOSIS — N40.1 BENIGN PROSTATIC HYPERPLASIA WITH LOWER URINARY TRACT SYMPTOMS, SYMPTOM DETAILS UNSPECIFIED: ICD-10-CM

## 2024-08-22 DIAGNOSIS — R74.8 ELEVATED CPK: ICD-10-CM

## 2024-08-22 DIAGNOSIS — I89.0 LYMPHEDEMA: ICD-10-CM

## 2024-08-22 DIAGNOSIS — F20.0 PARANOID SCHIZOPHRENIA: ICD-10-CM

## 2024-08-22 DIAGNOSIS — I82.90 VENOUS THROMBOEMBOLISM (VTE): ICD-10-CM

## 2024-08-22 DIAGNOSIS — N18.31 STAGE 3A CHRONIC KIDNEY DISEASE: ICD-10-CM

## 2024-08-22 DIAGNOSIS — Z12.5 PROSTATE CANCER SCREENING: ICD-10-CM

## 2024-08-22 DIAGNOSIS — E78.5 HYPERLIPIDEMIA, UNSPECIFIED HYPERLIPIDEMIA TYPE: ICD-10-CM

## 2024-08-22 LAB
ALBUMIN SERPL BCP-MCNC: 4 G/DL (ref 3.5–5.2)
ALP SERPL-CCNC: 84 U/L (ref 55–135)
ALT SERPL W/O P-5'-P-CCNC: 16 U/L (ref 10–44)
ANION GAP SERPL CALC-SCNC: 10 MMOL/L (ref 8–16)
AST SERPL-CCNC: 25 U/L (ref 10–40)
BASOPHILS # BLD AUTO: 0.04 K/UL (ref 0–0.2)
BASOPHILS NFR BLD: 0.7 % (ref 0–1.9)
BILIRUB SERPL-MCNC: 0.3 MG/DL (ref 0.1–1)
BUN SERPL-MCNC: 23 MG/DL (ref 8–23)
CALCIUM SERPL-MCNC: 9.7 MG/DL (ref 8.7–10.5)
CHLORIDE SERPL-SCNC: 107 MMOL/L (ref 95–110)
CHOLEST SERPL-MCNC: 236 MG/DL (ref 120–199)
CHOLEST/HDLC SERPL: 3.7 {RATIO} (ref 2–5)
CK SERPL-CCNC: 346 U/L (ref 20–200)
CO2 SERPL-SCNC: 24 MMOL/L (ref 23–29)
COMPLEXED PSA SERPL-MCNC: 2.7 NG/ML (ref 0–4)
CREAT SERPL-MCNC: 1.5 MG/DL (ref 0.5–1.4)
DIFFERENTIAL METHOD BLD: ABNORMAL
EOSINOPHIL # BLD AUTO: 0.1 K/UL (ref 0–0.5)
EOSINOPHIL NFR BLD: 1.5 % (ref 0–8)
ERYTHROCYTE [DISTWIDTH] IN BLOOD BY AUTOMATED COUNT: 13.1 % (ref 11.5–14.5)
EST. GFR  (NO RACE VARIABLE): 48.6 ML/MIN/1.73 M^2
GLUCOSE SERPL-MCNC: 93 MG/DL (ref 70–110)
HCT VFR BLD AUTO: 42.3 % (ref 40–54)
HDLC SERPL-MCNC: 64 MG/DL (ref 40–75)
HDLC SERPL: 27.1 % (ref 20–50)
HGB BLD-MCNC: 13.4 G/DL (ref 14–18)
IMM GRANULOCYTES # BLD AUTO: 0.01 K/UL (ref 0–0.04)
IMM GRANULOCYTES NFR BLD AUTO: 0.2 % (ref 0–0.5)
LDLC SERPL CALC-MCNC: 153.8 MG/DL (ref 63–159)
LYMPHOCYTES # BLD AUTO: 1.1 K/UL (ref 1–4.8)
LYMPHOCYTES NFR BLD: 20.8 % (ref 18–48)
MCH RBC QN AUTO: 30.5 PG (ref 27–31)
MCHC RBC AUTO-ENTMCNC: 31.7 G/DL (ref 32–36)
MCV RBC AUTO: 96 FL (ref 82–98)
MONOCYTES # BLD AUTO: 0.5 K/UL (ref 0.3–1)
MONOCYTES NFR BLD: 9 % (ref 4–15)
NEUTROPHILS # BLD AUTO: 3.7 K/UL (ref 1.8–7.7)
NEUTROPHILS NFR BLD: 67.8 % (ref 38–73)
NONHDLC SERPL-MCNC: 172 MG/DL
NRBC BLD-RTO: 0 /100 WBC
PLATELET # BLD AUTO: 197 K/UL (ref 150–450)
PMV BLD AUTO: 10.6 FL (ref 9.2–12.9)
POTASSIUM SERPL-SCNC: 4.7 MMOL/L (ref 3.5–5.1)
PROT SERPL-MCNC: 7.3 G/DL (ref 6–8.4)
RBC # BLD AUTO: 4.39 M/UL (ref 4.6–6.2)
SODIUM SERPL-SCNC: 141 MMOL/L (ref 136–145)
T4 FREE SERPL-MCNC: 0.8 NG/DL (ref 0.71–1.51)
TRIGL SERPL-MCNC: 91 MG/DL (ref 30–150)
TSH SERPL DL<=0.005 MIU/L-ACNC: 1.62 UIU/ML (ref 0.4–4)
WBC # BLD AUTO: 5.47 K/UL (ref 3.9–12.7)

## 2024-08-22 PROCEDURE — 85613 RUSSELL VIPER VENOM DILUTED: CPT | Performed by: INTERNAL MEDICINE

## 2024-08-22 PROCEDURE — 84439 ASSAY OF FREE THYROXINE: CPT | Performed by: INTERNAL MEDICINE

## 2024-08-22 PROCEDURE — 84153 ASSAY OF PSA TOTAL: CPT | Performed by: INTERNAL MEDICINE

## 2024-08-22 PROCEDURE — 85520 HEPARIN ASSAY: CPT | Performed by: INTERNAL MEDICINE

## 2024-08-22 PROCEDURE — 99999 PR PBB SHADOW E&M-EST. PATIENT-LVL III: CPT | Mod: PBBFAC,,, | Performed by: INTERNAL MEDICINE

## 2024-08-22 PROCEDURE — 85300 ANTITHROMBIN III ACTIVITY: CPT | Performed by: INTERNAL MEDICINE

## 2024-08-22 PROCEDURE — 86147 CARDIOLIPIN ANTIBODY EA IG: CPT | Mod: 59 | Performed by: INTERNAL MEDICINE

## 2024-08-22 PROCEDURE — 99213 OFFICE O/P EST LOW 20 MIN: CPT | Mod: PBBFAC | Performed by: INTERNAL MEDICINE

## 2024-08-22 PROCEDURE — 80053 COMPREHEN METABOLIC PANEL: CPT | Performed by: INTERNAL MEDICINE

## 2024-08-22 PROCEDURE — 99214 OFFICE O/P EST MOD 30 MIN: CPT | Mod: S$PBB,,, | Performed by: INTERNAL MEDICINE

## 2024-08-22 PROCEDURE — 80061 LIPID PANEL: CPT | Performed by: INTERNAL MEDICINE

## 2024-08-22 PROCEDURE — 84443 ASSAY THYROID STIM HORMONE: CPT | Performed by: INTERNAL MEDICINE

## 2024-08-22 PROCEDURE — 85610 PROTHROMBIN TIME: CPT | Performed by: INTERNAL MEDICINE

## 2024-08-22 PROCEDURE — 85025 COMPLETE CBC W/AUTO DIFF WBC: CPT | Performed by: INTERNAL MEDICINE

## 2024-08-22 PROCEDURE — 85613 RUSSELL VIPER VENOM DILUTED: CPT | Mod: 59 | Performed by: INTERNAL MEDICINE

## 2024-08-22 PROCEDURE — 82550 ASSAY OF CK (CPK): CPT | Performed by: INTERNAL MEDICINE

## 2024-08-24 LAB
APC INTERPRETATION: NORMAL
APCR PPP: 3 {RATIO}

## 2024-08-26 ENCOUNTER — HOSPITAL ENCOUNTER (OUTPATIENT)
Dept: VASCULAR SURGERY | Facility: CLINIC | Age: 74
Discharge: HOME | End: 2024-08-26
Attending: INTERNAL MEDICINE
Payer: MEDICARE

## 2024-08-26 DIAGNOSIS — I82.509 CHRONIC DEEP VEIN THROMBOSIS (DVT): Primary | ICD-10-CM

## 2024-08-26 DIAGNOSIS — I82.90 VENOUS THROMBOEMBOLISM (VTE): ICD-10-CM

## 2024-08-26 DIAGNOSIS — I82.513 CHRONIC DEEP VEIN THROMBOSIS (DVT) OF FEMORAL VEIN OF BOTH LOWER EXTREMITIES: ICD-10-CM

## 2024-08-26 LAB
AT III ACT/NOR PPP CHRO: 131 % (ref 83–118)
CARDIOLIPIN IGG SER IA-ACNC: <9.4 GPL (ref 0–14.99)
CARDIOLIPIN IGM SER IA-ACNC: <9.4 MPL (ref 0–12.49)

## 2024-08-26 PROCEDURE — 93970 EXTREMITY STUDY: CPT | Mod: PBBFAC | Performed by: SURGERY

## 2024-08-27 LAB
CONFIRM DRVVT STA-STACLOT: ABNORMAL S
DRVVT SCREEN TO CONFIRM RATIO: ABNORMAL {RATIO}
HEPARIN NT PPP QL: ABNORMAL
LA 3 SCREEN W REFLEX-IMP: ABNORMAL
LMW HEPARIN IND PLT AB SER QL: PRESENT
MIXING DRVVT/NORMAL: ABNORMAL %
NEUTRALIZED DRVVT SCREEN RATIO: 0.99
PROTHROMBIN TIME: 20 S (ref 12–15.5)
SCREEN APTT/NORMAL: 0.95
SCREEN APTT/NORMAL: ABNORMAL
SCREEN DRVVT/NORMAL: 2.71 %
THROMBIN TIME: ABNORMAL S

## 2024-12-07 ENCOUNTER — PATIENT MESSAGE (OUTPATIENT)
Dept: INTERNAL MEDICINE | Facility: CLINIC | Age: 74
End: 2024-12-07
Payer: MEDICARE

## 2025-09-05 ENCOUNTER — TELEPHONE (OUTPATIENT)
Dept: ENDOSCOPY | Facility: HOSPITAL | Age: 75
End: 2025-09-05
Payer: MEDICARE